# Patient Record
Sex: MALE | Race: WHITE | Employment: FULL TIME
[De-identification: names, ages, dates, MRNs, and addresses within clinical notes are randomized per-mention and may not be internally consistent; named-entity substitution may affect disease eponyms.]

---

## 2017-03-01 ENCOUNTER — OFFICE VISIT (OUTPATIENT)
Facility: CLINIC | Age: 32
End: 2017-03-01

## 2017-03-01 VITALS
TEMPERATURE: 98.2 F | WEIGHT: 189.2 LBS | DIASTOLIC BLOOD PRESSURE: 76 MMHG | OXYGEN SATURATION: 97 % | SYSTOLIC BLOOD PRESSURE: 132 MMHG | BODY MASS INDEX: 27.15 KG/M2 | HEART RATE: 86 BPM

## 2017-03-01 DIAGNOSIS — G89.29 CHRONIC RIGHT SHOULDER PAIN: Primary | ICD-10-CM

## 2017-03-01 DIAGNOSIS — F98.8 ADD (ATTENTION DEFICIT DISORDER): ICD-10-CM

## 2017-03-01 DIAGNOSIS — F41.9 ANXIETY: ICD-10-CM

## 2017-03-01 DIAGNOSIS — E66.3 OVERWEIGHT (BMI 25.0-29.9): ICD-10-CM

## 2017-03-01 DIAGNOSIS — M25.511 CHRONIC RIGHT SHOULDER PAIN: Primary | ICD-10-CM

## 2017-03-01 DIAGNOSIS — G89.29 CHRONIC PAIN OF LEFT ANKLE: ICD-10-CM

## 2017-03-01 DIAGNOSIS — M25.572 CHRONIC PAIN OF LEFT ANKLE: ICD-10-CM

## 2017-03-01 PROCEDURE — 99214 OFFICE O/P EST MOD 30 MIN: CPT | Performed by: FAMILY MEDICINE

## 2017-03-01 RX ORDER — MELOXICAM 15 MG/1
15 TABLET ORAL DAILY
Qty: 30 TABLET | Refills: 3 | Status: SHIPPED | OUTPATIENT
Start: 2017-03-01 | End: 2019-04-17 | Stop reason: SDUPTHER

## 2017-03-08 ENCOUNTER — TELEPHONE (OUTPATIENT)
Facility: CLINIC | Age: 32
End: 2017-03-08

## 2017-03-10 ENCOUNTER — TELEPHONE (OUTPATIENT)
Facility: CLINIC | Age: 32
End: 2017-03-10

## 2017-03-10 DIAGNOSIS — M25.511 CHRONIC RIGHT SHOULDER PAIN: ICD-10-CM

## 2017-03-10 DIAGNOSIS — M25.511 CHRONIC RIGHT SHOULDER PAIN: Primary | ICD-10-CM

## 2017-03-10 DIAGNOSIS — G89.29 CHRONIC RIGHT SHOULDER PAIN: Primary | ICD-10-CM

## 2017-03-10 DIAGNOSIS — G89.29 CHRONIC RIGHT SHOULDER PAIN: ICD-10-CM

## 2018-03-20 ENCOUNTER — HOSPITAL ENCOUNTER (OUTPATIENT)
Dept: PHYSICAL THERAPY | Facility: CLINIC | Age: 33
Setting detail: THERAPIES SERIES
Discharge: HOME | End: 2018-03-20
Payer: COMMERCIAL

## 2018-03-20 PROCEDURE — 97750 PHYSICAL PERFORMANCE TEST: CPT

## 2019-10-23 DIAGNOSIS — G89.29 CHRONIC PAIN OF LEFT ANKLE: ICD-10-CM

## 2019-10-23 DIAGNOSIS — G89.29 CHRONIC RIGHT SHOULDER PAIN: ICD-10-CM

## 2019-10-23 DIAGNOSIS — M25.572 CHRONIC PAIN OF LEFT ANKLE: ICD-10-CM

## 2019-10-23 DIAGNOSIS — M25.511 CHRONIC RIGHT SHOULDER PAIN: ICD-10-CM

## 2019-10-24 RX ORDER — MELOXICAM 15 MG/1
15 TABLET ORAL DAILY
Qty: 30 TABLET | Refills: 3 | Status: SHIPPED | OUTPATIENT
Start: 2019-10-24 | End: 2019-12-11 | Stop reason: ALTCHOICE

## 2019-11-15 ENCOUNTER — HOSPITAL ENCOUNTER (OUTPATIENT)
Dept: MRI IMAGING | Facility: CLINIC | Age: 34
Discharge: HOME | End: 2019-11-17
Payer: COMMERCIAL

## 2019-11-15 DIAGNOSIS — R20.2 PARESTHESIA: ICD-10-CM

## 2019-11-15 DIAGNOSIS — M54.50 ACUTE RIGHT-SIDED LOW BACK PAIN WITHOUT SCIATICA: ICD-10-CM

## 2019-11-15 PROCEDURE — 72148 MRI LUMBAR SPINE W/O DYE: CPT

## 2019-12-11 ENCOUNTER — OFFICE VISIT (OUTPATIENT)
Dept: NEUROSURGERY | Age: 34
End: 2019-12-11
Payer: COMMERCIAL

## 2019-12-11 ENCOUNTER — PATIENT MESSAGE (OUTPATIENT)
Dept: NEUROSURGERY | Age: 34
End: 2019-12-11

## 2019-12-11 VITALS
BODY MASS INDEX: 28.58 KG/M2 | HEART RATE: 85 BPM | HEIGHT: 69 IN | RESPIRATION RATE: 16 BRPM | DIASTOLIC BLOOD PRESSURE: 82 MMHG | SYSTOLIC BLOOD PRESSURE: 138 MMHG | WEIGHT: 193 LBS

## 2019-12-11 DIAGNOSIS — M43.10 PARS DEFECT WITH SPONDYLOLISTHESIS: Primary | ICD-10-CM

## 2019-12-11 DIAGNOSIS — M43.00 PARS DEFECT WITH SPONDYLOLISTHESIS: Primary | ICD-10-CM

## 2019-12-11 PROCEDURE — G8417 CALC BMI ABV UP PARAM F/U: HCPCS | Performed by: NURSE PRACTITIONER

## 2019-12-11 PROCEDURE — G8427 DOCREV CUR MEDS BY ELIG CLIN: HCPCS | Performed by: NURSE PRACTITIONER

## 2019-12-11 PROCEDURE — 1036F TOBACCO NON-USER: CPT | Performed by: NURSE PRACTITIONER

## 2019-12-11 PROCEDURE — 99203 OFFICE O/P NEW LOW 30 MIN: CPT | Performed by: NURSE PRACTITIONER

## 2019-12-11 PROCEDURE — G8484 FLU IMMUNIZE NO ADMIN: HCPCS | Performed by: NURSE PRACTITIONER

## 2019-12-23 ENCOUNTER — HOSPITAL ENCOUNTER (OUTPATIENT)
Dept: PHYSICAL THERAPY | Age: 34
Setting detail: THERAPIES SERIES
Discharge: HOME | End: 2019-12-23
Payer: COMMERCIAL

## 2019-12-23 PROCEDURE — 97110 THERAPEUTIC EXERCISES: CPT

## 2019-12-23 PROCEDURE — 97161 PT EVAL LOW COMPLEX 20 MIN: CPT

## 2020-01-06 NOTE — DISCHARGE SUMMARY
[x] Critical access hospital &  Therapy  [address]  [phone]  F: [phone] [] [address]  [place] [address]   [address]  P: [phone]  F: [phone] [] [place]  [address] [facility]  P: [phone]  F: [phone] [] [address]  [facility]   [address]   [place]: [phone]  F: [phone]         Physical Therapy Discharge Note    Date: 2020      Patient: Nic Starkey  : 1985  MRN: [id number]: Dahlia Stapleton CNP                         Insurance: [employer], [place]   Medical Diagnosis: Pars Defect with spondylolisthesis M43.00, M43.10                 Rehab Codes: M54.5  Onset Date: 10/23/19                             Next 's appt:       Subjective:  Refer to initial evaluation. Objective:  Refer to initial evaluation. Assessment:  Refer to initial evaluation. Treatment to Date:  [x] Therapeutic Exercise    [] Modalities:  [] Therapeutic Activity     [] Ultrasound  [] Electrical Stimulation  [] Gait Training     [] Massage       [] Lumbar/Cervical Traction  [] Neuromuscular Re-education [] Cold/hotpack [] Iontophoresis: 4 mg/mL  [x] Instruction in Home Exercise Program                     Dexamethasone Sodium  [] Manual Therapy             Phosphate 40-80 mAmin  [] Aquatic Therapy                   [] Vasocompression/    [] Other:             Game Ready    Discharge Status:     [] Pt to continue exercise/home instructions independently. [] Therapy interrupted due to:    [] Pt has 2 or more no shows/cancels, is discontinued per our policy. [x] Other: Patient has self discharged from PT, does not wish to continue PT or undergo a future spinal surgery per message.      Electronically signed by Mary Caballero PT on 2020 at 1:46 PM    If you have any questions or concerns, please don't hesitate to call.   Thank you for your referral.

## 2020-01-07 ENCOUNTER — HOSPITAL ENCOUNTER (OUTPATIENT)
Dept: PHYSICAL THERAPY | Age: 35
Setting detail: THERAPIES SERIES
Discharge: HOME | End: 2020-01-07
Payer: COMMERCIAL

## 2020-12-29 DIAGNOSIS — M43.00 PARS DEFECT WITH SPONDYLOLISTHESIS: Primary | ICD-10-CM

## 2020-12-29 DIAGNOSIS — M43.10 PARS DEFECT WITH SPONDYLOLISTHESIS: Primary | ICD-10-CM

## 2021-03-10 ENCOUNTER — HOSPITAL ENCOUNTER (OUTPATIENT)
Dept: GENERAL RADIOLOGY | Age: 36
Discharge: HOME | End: 2021-03-12
Payer: COMMERCIAL

## 2021-03-10 ENCOUNTER — OFFICE VISIT (OUTPATIENT)
Dept: NEUROSURGERY | Age: 36
End: 2021-03-10
Payer: COMMERCIAL

## 2021-03-10 ENCOUNTER — HOSPITAL ENCOUNTER (OUTPATIENT)
Age: 36
Discharge: HOME | End: 2021-03-12
Payer: COMMERCIAL

## 2021-03-10 VITALS
HEART RATE: 79 BPM | BODY MASS INDEX: 28.63 KG/M2 | WEIGHT: 200 LBS | HEIGHT: 70 IN | SYSTOLIC BLOOD PRESSURE: 136 MMHG | TEMPERATURE: 98.7 F | DIASTOLIC BLOOD PRESSURE: 93 MMHG

## 2021-03-10 DIAGNOSIS — M43.00 PARS DEFECT WITH SPONDYLOLISTHESIS: Primary | ICD-10-CM

## 2021-03-10 DIAGNOSIS — M43.00 PARS DEFECT WITH SPONDYLOLISTHESIS: ICD-10-CM
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
## 2021-03-10 DIAGNOSIS — M43.10 PARS DEFECT WITH SPONDYLOLISTHESIS: ICD-10-CM

## 2021-03-10 DIAGNOSIS — M43.10 PARS DEFECT WITH SPONDYLOLISTHESIS: Primary | ICD-10-CM

## 2021-03-10 PROCEDURE — G8417 CALC BMI ABV UP PARAM F/U: HCPCS | Performed by: NURSE PRACTITIONER

## 2021-03-10 PROCEDURE — 72100 X-RAY EXAM L-S SPINE 2/3 VWS: CPT

## 2021-03-10 PROCEDURE — 1036F TOBACCO NON-USER: CPT | Performed by: NURSE PRACTITIONER

## 2021-03-10 PROCEDURE — G8427 DOCREV CUR MEDS BY ELIG CLIN: HCPCS | Performed by: NURSE PRACTITIONER

## 2021-03-10 PROCEDURE — 99213 OFFICE O/P EST LOW 20 MIN: CPT | Performed by: NURSE PRACTITIONER

## 2021-03-10 PROCEDURE — G8484 FLU IMMUNIZE NO ADMIN: HCPCS | Performed by: NURSE PRACTITIONER

## 2021-03-10 RX ORDER — CYCLOBENZAPRINE HCL 10 MG
TABLET ORAL
COMMUNITY
End: 2021-11-17
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
## 2021-03-24 ENCOUNTER — TELEMEDICINE (OUTPATIENT)
Dept: NEUROSURGERY | Age: 36
End: 2021-03-24
Payer: COMMERCIAL

## 2021-03-24 DIAGNOSIS — M43.00 PARS DEFECT WITH SPONDYLOLISTHESIS: Primary | ICD-10-CM

## 2021-03-24 DIAGNOSIS — M43.10 PARS DEFECT WITH SPONDYLOLISTHESIS: Primary | ICD-10-CM

## 2021-03-24 PROCEDURE — G8427 DOCREV CUR MEDS BY ELIG CLIN: HCPCS | Performed by: NURSE PRACTITIONER

## 2021-03-24 PROCEDURE — G8484 FLU IMMUNIZE NO ADMIN: HCPCS | Performed by: NURSE PRACTITIONER

## 2021-03-24 PROCEDURE — G8417 CALC BMI ABV UP PARAM F/U: HCPCS | Performed by: NURSE PRACTITIONER

## 2021-03-24 PROCEDURE — 1036F TOBACCO NON-USER: CPT | Performed by: NURSE PRACTITIONER

## 2021-03-24 PROCEDURE — 99213 OFFICE O/P EST LOW 20 MIN: CPT | Performed by: NURSE PRACTITIONER
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
## 2021-08-02 ENCOUNTER — HOSPITAL ENCOUNTER (OUTPATIENT)
Dept: PHYSICAL THERAPY | Age: 36
Setting detail: THERAPIES SERIES
Discharge: HOME | End: 2021-08-02
Payer: OTHER MISCELLANEOUS

## 2021-08-02 PROCEDURE — 97140 MANUAL THERAPY 1/> REGIONS: CPT

## 2021-08-02 PROCEDURE — 97110 THERAPEUTIC EXERCISES: CPT

## 2021-08-02 PROCEDURE — 97162 PT EVAL MOD COMPLEX 30 MIN: CPT
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
## 2021-08-11 ENCOUNTER — HOSPITAL ENCOUNTER (OUTPATIENT)
Dept: PHYSICAL THERAPY | Age: 36
Setting detail: THERAPIES SERIES
Discharge: HOME | End: 2021-08-11
Payer: OTHER MISCELLANEOUS

## 2021-08-11 PROCEDURE — 97110 THERAPEUTIC EXERCISES: CPT

## 2021-08-11 PROCEDURE — 97140 MANUAL THERAPY 1/> REGIONS: CPT

## 2021-08-11 PROCEDURE — G0283 ELEC STIM OTHER THAN WOUND: HCPCS

## 2021-08-16 ENCOUNTER — HOSPITAL ENCOUNTER (OUTPATIENT)
Dept: PHYSICAL THERAPY | Age: 36
Setting detail: THERAPIES SERIES
Discharge: HOME | End: 2021-08-16
Payer: OTHER MISCELLANEOUS

## 2021-08-16 PROCEDURE — G0283 ELEC STIM OTHER THAN WOUND: HCPCS

## 2021-08-16 PROCEDURE — 97140 MANUAL THERAPY 1/> REGIONS: CPT

## 2021-08-16 PROCEDURE — 97110 THERAPEUTIC EXERCISES: CPT

## 2021-08-19 ENCOUNTER — HOSPITAL ENCOUNTER (OUTPATIENT)
Dept: PHYSICAL THERAPY | Age: 36
Setting detail: THERAPIES SERIES
Discharge: HOME | End: 2021-08-19
Payer: OTHER MISCELLANEOUS

## 2021-08-19 PROCEDURE — 97140 MANUAL THERAPY 1/> REGIONS: CPT

## 2021-08-19 PROCEDURE — G0283 ELEC STIM OTHER THAN WOUND: HCPCS

## 2021-09-20 ENCOUNTER — NURSE TRIAGE (OUTPATIENT)
Dept: OTHER | Facility: CLINIC | Age: 36
End: 2021-09-20
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
## 2021-10-20 ENCOUNTER — TELEPHONE (OUTPATIENT)
Dept: PSYCHOLOGY | Age: 36
End: 2021-10-20

## 2021-10-20 PROBLEM — K21.9 GASTROESOPHAGEAL REFLUX DISEASE WITHOUT ESOPHAGITIS: Status: ACTIVE | Noted: 2021-10-20

## 2021-10-20 PROBLEM — F41.9 ANXIETY: Status: ACTIVE | Noted: 2021-10-20

## 2021-10-20 PROBLEM — Z00.00 ANNUAL PHYSICAL EXAM: Status: ACTIVE | Noted: 2021-10-20

## 2021-10-23 ENCOUNTER — HOSPITAL ENCOUNTER (OUTPATIENT)
Age: 36
Discharge: HOME | End: 2021-10-23
Payer: COMMERCIAL

## 2021-10-23 DIAGNOSIS — Z00.00 ANNUAL PHYSICAL EXAM: ICD-10-CM

## 2021-10-23 LAB
ABSOLUTE EOS #: 0.3 K/UL (ref 0–0.4)
ABSOLUTE IMMATURE GRANULOCYTE: ABNORMAL K/UL (ref 0–0.3)
ABSOLUTE LYMPH #: 1.9 K/UL (ref 1–4.8)
ABSOLUTE MONO #: 0.6 K/UL (ref 0.1–1.3)
ALBUMIN SERPL-MCNC: 4.4 G/DL (ref 3.5–5.2)
ALBUMIN/GLOBULIN RATIO: ABNORMAL (ref 1–2.5)
ALP BLD-CCNC: 82 U/L (ref 40–129)
ALT SERPL-CCNC: 32 U/L (ref 5–41)
ANION GAP SERPL CALCULATED.3IONS-SCNC: 9 MMOL/L (ref 9–17)
AST SERPL-CCNC: 16 U/L
BASOPHILS # BLD: 1 % (ref 0–2)
BASOPHILS ABSOLUTE: 0 K/UL (ref 0–0.2)
BILIRUB SERPL-MCNC: 0.67 MG/DL (ref 0.3–1.2)
BUN BLDV-MCNC: 13 MG/DL (ref 6–20)
BUN/CREAT BLD: ABNORMAL (ref 9–20)
CALCIUM SERPL-MCNC: 9.1 MG/DL (ref 8.6–10.4)
CHLORIDE BLD-SCNC: 106 MMOL/L (ref 98–107)
CHOLESTEROL, FASTING: 160 MG/DL
CHOLESTEROL/HDL RATIO: 5
CO2: 26 MMOL/L (ref 20–31)
CREAT SERPL-MCNC: 0.8 MG/DL (ref 0.7–1.2)
DIFFERENTIAL TYPE: ABNORMAL
EOSINOPHILS RELATIVE PERCENT: 4 % (ref 0–4)
GFR AFRICAN AMERICAN: >60 ML/MIN
GFR NON-AFRICAN AMERICAN: >60 ML/MIN
GFR SERPL CREATININE-BSD FRML MDRD: ABNORMAL ML/MIN/{1.73_M2}
GFR SERPL CREATININE-BSD FRML MDRD: ABNORMAL ML/MIN/{1.73_M2}
GLUCOSE BLD-MCNC: 106 MG/DL (ref 70–99)
HCT VFR BLD CALC: 43.1 % (ref 41–53)
HDLC SERPL-MCNC: 32 MG/DL
HEMOGLOBIN: 14.9 G/DL (ref 13.5–17.5)
IMMATURE GRANULOCYTES: ABNORMAL %
LDL CHOLESTEROL: 111 MG/DL (ref 0–130)
LYMPHOCYTES # BLD: 29 % (ref 24–44)
MCH RBC QN AUTO: 30.4 PG (ref 26–34)
MCHC RBC AUTO-ENTMCNC: 34.6 G/DL (ref 31–37)
MCV RBC AUTO: 87.7 FL (ref 80–100)
MONOCYTES # BLD: 9 % (ref 1–7)
NRBC AUTOMATED: ABNORMAL PER 100 WBC
PDW BLD-RTO: 12.7 % (ref 11.5–14.9)
PLATELET # BLD: 245 K/UL (ref 150–450)
PLATELET ESTIMATE: ABNORMAL
PMV BLD AUTO: 9.1 FL (ref 6–12)
POTASSIUM SERPL-SCNC: 4 MMOL/L (ref 3.7–5.3)
RBC # BLD: 4.91 M/UL (ref 4.5–5.9)
RBC # BLD: ABNORMAL 10*6/UL
SEG NEUTROPHILS: 57 % (ref 36–66)
SEGMENTED NEUTROPHILS ABSOLUTE COUNT: 3.7 K/UL (ref 1.3–9.1)
SODIUM BLD-SCNC: 141 MMOL/L (ref 135–144)
T3 FREE: 3.65 PG/ML (ref 2.02–4.43)
TOTAL PROTEIN: 7.2 G/DL (ref 6.4–8.3)
TRIGLYCERIDE, FASTING: 86 MG/DL
TSH SERPL DL<=0.05 MIU/L-ACNC: 0.62 MIU/L (ref 0.3–5)
VLDLC SERPL CALC-MCNC: ABNORMAL MG/DL (ref 1–30)
WBC # BLD: 6.5 K/UL (ref 3.5–11)
WBC # BLD: ABNORMAL 10*3/UL

## 2021-10-23 PROCEDURE — 85025 COMPLETE CBC W/AUTO DIFF WBC: CPT

## 2021-10-23 PROCEDURE — 84436 ASSAY OF TOTAL THYROXINE: CPT

## 2021-10-23 PROCEDURE — 36415 COLL VENOUS BLD VENIPUNCTURE: CPT

## 2021-10-23 PROCEDURE — 84443 ASSAY THYROID STIM HORMONE: CPT

## 2021-10-23 PROCEDURE — 80061 LIPID PANEL: CPT

## 2021-10-23 PROCEDURE — 80053 COMPREHEN METABOLIC PANEL: CPT

## 2021-10-23 PROCEDURE — 84481 FREE ASSAY (FT-3): CPT

## 2021-10-24 LAB — T4 TOTAL: 5.8 UG/DL (ref 4.5–10.9)

## 2021-11-12 ENCOUNTER — OFFICE VISIT (OUTPATIENT)
Dept: PSYCHOLOGY | Age: 36
End: 2021-11-12
Payer: COMMERCIAL

## 2021-11-12 DIAGNOSIS — F43.22 ADJUSTMENT DISORDER WITH ANXIOUS MOOD: Primary | ICD-10-CM

## 2021-11-12 PROCEDURE — 90791 PSYCH DIAGNOSTIC EVALUATION: CPT | Performed by: SOCIAL WORKER

## 2021-11-12 PROCEDURE — 1036F TOBACCO NON-USER: CPT | Performed by: SOCIAL WORKER
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
## 2021-11-19 PROBLEM — Z00.00 ANNUAL PHYSICAL EXAM: Status: RESOLVED | Noted: 2021-10-20 | Resolved: 2021-11-19

## 2021-11-23 ENCOUNTER — HOSPITAL ENCOUNTER (OUTPATIENT)
Dept: SLEEP CENTER | Age: 36
Discharge: HOME | End: 2021-11-25
Payer: COMMERCIAL

## 2021-11-23 DIAGNOSIS — R06.83 LOUD SNORING: ICD-10-CM

## 2021-11-23 DIAGNOSIS — G47.00 FREQUENT NOCTURNAL AWAKENING: ICD-10-CM

## 2021-11-23 PROCEDURE — 95810 POLYSOM 6/> YRS 4/> PARAM: CPT

## 2021-11-24 VITALS
HEIGHT: 69 IN | WEIGHT: 195 LBS | RESPIRATION RATE: 14 BRPM | OXYGEN SATURATION: 95 % | BODY MASS INDEX: 28.88 KG/M2 | HEART RATE: 55 BPM

## 2021-12-06 LAB — STATUS: NORMAL

## 2021-12-06 PROCEDURE — 95810 POLYSOM 6/> YRS 4/> PARAM: CPT | Performed by: PSYCHIATRY & NEUROLOGY

## 2021-12-08 ENCOUNTER — OFFICE VISIT (OUTPATIENT)
Dept: PSYCHOLOGY | Age: 36
End: 2021-12-08
Payer: COMMERCIAL

## 2021-12-08 DIAGNOSIS — F43.22 ADJUSTMENT DISORDER WITH ANXIOUS MOOD: Primary | ICD-10-CM

## 2021-12-08 PROCEDURE — 1036F TOBACCO NON-USER: CPT | Performed by: SOCIAL WORKER

## 2021-12-08 PROCEDURE — 90832 PSYTX W PT 30 MINUTES: CPT | Performed by: SOCIAL WORKER

## 2021-12-09 NOTE — PROGRESS NOTES
ADULT BEHAVIORAL HEALTH FOLLOW UP  SARAH Jones  Licensed Independent          Visit Date: 12/8/2021   Time of appointment: 4:00 PM    Time spent with Patient: 35 minutes. This is patient's second appointment. Reason for Consult:  Anxiety     PCP:  Debbie Lugo MD      Pt provided informed consent for the behavioral health program. Discussed with patient model of service to include the limits of confidentiality (i.e. abuse reporting, suicide intervention, etc.) and short-term intervention focused approach. Pt indicated understanding. Liana Lilly is a 39 y.o. male who presents for follow up of anxiety. Jeremy Reece reported he has experienced improvement in anxious symptoms since last visit. He shared that his wife has also noticed improvement in his behavior. He believes this may be related to taking his medication. He admitted that there are still various behaviors and times that he becomes frustrated with the children and sometimes reacts. Jeremy Reece was able to recognize in session that him and his wife not being on the same page often contributes to conflict instead of assisting with parenting strategies. He is hesitant to change some of his behavior due to how he was raised and beliefs, but is open minded to learn new ways to work through family conflict. At this time, Néstor is being referred to family counseling as many of his symptoms are related to family concerns and conflict resolution within relationship. Previous Recommendations: Jeremy Reece was scheduled for follow-up appointment to discuss treatment options on 11/24 after he speaks with his wife about family services. MENTAL STATUS EXAM  Mood was within normal limits with calm affect. Suicidal ideation was denied. Homicidal ideation was denied. Hygiene was good . Dress was appropriate. Behavior was Within Normal Limits with No observation or self-report of difficulties ambulating.    Attitude was Engageable. Eye-contact was good. Speech: rate - WNL, rhythm - WNL, volume - WNL. Verbalizations were coherent. Thought processes were intact and goal-oriented without evidence of delusions, hallucinations, obsessions, or clair; with moderate cognitive distortions. Associations were characterized by intact cognitive processes. Pt was oriented to person, place, time, and general circumstances;  recent:  good. Insight and judgment were estimated to be good, AEB, a fair  understanding of cyclical maladaptive patterns, and the ability to use insight to inform behavior change. ASSESSMENT  Sari Cabot presented to the appointment today for evaluation and treatment of symptoms of anxiety. He is currently deemed no risk to himself or others and meets criteria for Adjustment disorder with anxious mood. Nury Marroquin was in agreement with recommendations of receiving referral to family counselor to begin addressing issues within family system to improve his relationship with wife and children. Referral information sent via My Chart. PHQ Scores 11/2/2021 2/11/2021 11/11/2019 7/26/2017   PHQ2 Score 2 0 0 0   PHQ9 Score 2 0 0 0     Interpretation of Total Score Depression Severity: 1-4 = Minimal depression, 5-9 = Mild depression, 10-14 = Moderate depression, 15-19 = Moderately severe depression, 20-27 = Severe depression    How often pt has had thoughts of death or hurting self (if PHQ positive for depression):       JAYA 7 SCORE 10/28/2021   JAYA-7 Total Score 13     Interpretation of JAYA-7 score: 5-9 = mild anxiety, 10-14 = moderate anxiety, 15+ = severe anxiety. Recommend referral to behavioral health for scores 10 or greater. DIAGNOSIS  Nury Marroquin was seen today for anxiety.     Diagnoses and all orders for this visit:    Adjustment disorder with anxious mood        INTERVENTION  CBT to target cycle of anxious thoughts, communication barriers/breakdown within family system, Collaborative treatment planning,Clarified role of [facility] in primary care,Recommended that pt establish with a mental health clinician with whom they can meet regularly for psychotherapy services and Reviewed options for identifying appropriate providers. Rosanne Laura was sent family counseling referrals via My Chart message for further follow-up. INTERACTIVE COMPLEXITY  Is interactive complexity present?   No  Reason:  N/A  Additional Supporting Information:  N/A       Electronically signed by Graciela Begum on 12/22/2021 at 4:13 PM

## 2022-03-25 ENCOUNTER — TELEPHONE (OUTPATIENT)
Dept: GASTROENTEROLOGY | Age: 37
End: 2022-03-25

## 2022-03-25 NOTE — TELEPHONE ENCOUNTER
Pt called to [facility] appt was referred by pcp pt does not have a referral. Writer advised pt to call pcp and have them send referral and call back to [facility].

## 2022-04-01 ENCOUNTER — OFFICE VISIT (OUTPATIENT)
Dept: FAMILY MEDICINE CLINIC | Age: 37
End: 2022-04-01
Payer: COMMERCIAL

## 2022-04-01 VITALS
WEIGHT: 215 LBS | RESPIRATION RATE: 20 BRPM | SYSTOLIC BLOOD PRESSURE: 118 MMHG | HEART RATE: 70 BPM | DIASTOLIC BLOOD PRESSURE: 78 MMHG | BODY MASS INDEX: 31.84 KG/M2 | OXYGEN SATURATION: 98 % | HEIGHT: 69 IN

## 2022-04-01 DIAGNOSIS — R73.01 ELEVATED FASTING GLUCOSE: ICD-10-CM

## 2022-04-01 DIAGNOSIS — F41.9 ANXIETY: ICD-10-CM

## 2022-04-01 DIAGNOSIS — J30.2 SEASONAL ALLERGIES: ICD-10-CM

## 2022-04-01 DIAGNOSIS — K21.9 GASTROESOPHAGEAL REFLUX DISEASE WITHOUT ESOPHAGITIS: ICD-10-CM

## 2022-04-01 DIAGNOSIS — Z76.89 ENCOUNTER TO ESTABLISH CARE: Primary | ICD-10-CM

## 2022-04-01 DIAGNOSIS — K58.0 IRRITABLE BOWEL SYNDROME WITH DIARRHEA: ICD-10-CM

## 2022-04-01 LAB — HBA1C MFR BLD: 5.5 %

## 2022-04-01 PROCEDURE — 99214 OFFICE O/P EST MOD 30 MIN: CPT | Performed by: NURSE PRACTITIONER

## 2022-04-01 PROCEDURE — G8427 DOCREV CUR MEDS BY ELIG CLIN: HCPCS | Performed by: NURSE PRACTITIONER

## 2022-04-01 PROCEDURE — 1036F TOBACCO NON-USER: CPT | Performed by: NURSE PRACTITIONER

## 2022-04-01 PROCEDURE — 83036 HEMOGLOBIN GLYCOSYLATED A1C: CPT | Performed by: NURSE PRACTITIONER

## 2022-04-01 PROCEDURE — G8417 CALC BMI ABV UP PARAM F/U: HCPCS | Performed by: NURSE PRACTITIONER

## 2022-04-01 RX ORDER — OMEPRAZOLE 20 MG/1
20 CAPSULE, DELAYED RELEASE ORAL DAILY
Qty: 30 CAPSULE | Refills: 3 | Status: SHIPPED | OUTPATIENT
Start: 2022-04-01 | End: 2022-08-24

## 2022-04-01 RX ORDER — DICYCLOMINE HYDROCHLORIDE 10 MG/1
10 CAPSULE ORAL 4 TIMES DAILY
Qty: 120 CAPSULE | Refills: 3 | Status: SHIPPED | OUTPATIENT
Start: 2022-04-01

## 2022-04-01 RX ORDER — FEXOFENADINE HCL AND PSEUDOEPHEDRINE HCI 180; 240 MG/1; MG/1
1 TABLET, EXTENDED RELEASE ORAL DAILY
Qty: 30 TABLET | Refills: 2 | Status: SHIPPED | OUTPATIENT
Start: 2022-04-01 | End: 2022-05-18 | Stop reason: SDUPTHER

## 2022-04-01 RX ORDER — HYDROXYZINE HYDROCHLORIDE 25 MG/1
25 TABLET, FILM COATED ORAL EVERY 8 HOURS PRN
Qty: 30 TABLET | Refills: 0
Start: 2022-04-01 | End: 2022-04-11

## 2022-04-01 SDOH — HEALTH STABILITY: PHYSICAL HEALTH: ON AVERAGE, HOW MANY MINUTES DO YOU ENGAGE IN EXERCISE AT THIS LEVEL?: 60 MIN

## 2022-04-01 SDOH — HEALTH STABILITY: PHYSICAL HEALTH: ON AVERAGE, HOW MANY DAYS PER WEEK DO YOU ENGAGE IN MODERATE TO STRENUOUS EXERCISE (LIKE A BRISK WALK)?: 5 DAYS

## 2022-04-01 SDOH — ECONOMIC STABILITY: FOOD INSECURITY: WITHIN THE PAST 12 MONTHS, YOU WORRIED THAT YOUR FOOD WOULD RUN OUT BEFORE YOU GOT MONEY TO BUY MORE.: NEVER TRUE

## 2022-04-01 SDOH — ECONOMIC STABILITY: FOOD INSECURITY: WITHIN THE PAST 12 MONTHS, THE FOOD YOU BOUGHT JUST DIDN'T LAST AND YOU DIDN'T HAVE MONEY TO GET MORE.: NEVER TRUE

## 2022-04-01 ASSESSMENT — SOCIAL DETERMINANTS OF HEALTH (SDOH)
WITHIN THE LAST YEAR, HAVE YOU BEEN KICKED, HIT, SLAPPED, OR OTHERWISE PHYSICALLY HURT BY YOUR PARTNER OR EX-PARTNER?: NO
WITHIN THE LAST YEAR, HAVE YOU BEEN AFRAID OF YOUR PARTNER OR EX-PARTNER?: NO
WITHIN THE LAST YEAR, HAVE YOU BEEN HUMILIATED OR EMOTIONALLY ABUSED IN OTHER WAYS BY YOUR PARTNER OR EX-PARTNER?: NO
WITHIN THE LAST YEAR, HAVE TO BEEN RAPED OR FORCED TO HAVE ANY KIND OF SEXUAL ACTIVITY BY YOUR PARTNER OR EX-PARTNER?: NO
HOW HARD IS IT FOR YOU TO PAY FOR THE VERY BASICS LIKE FOOD, HOUSING, MEDICAL CARE, AND HEATING?: NOT HARD AT ALL

## 2022-04-01 NOTE — PATIENT INSTRUCTIONS
Patient Education        dicyclomine (oral/injection)  Pronunciation: steve hobbsdavid arevalo  Brand: Bentyl, Dibent, Dicyclocot  What is the most important information I should know about dicyclomine? Many drugs can affect dicyclomine. Tell your doctor about all your current medicines. What is dicyclomine? Dicyclomine is used to treat functional bowel or irritable bowel syndrome. Dicyclomine may also be used for purposes not listed in this medication guide. What should I discuss with my healthcare provider before taking dicyclomine? You should not use dicyclomine if you are allergic to it, or if you have:   glaucoma;   a bladder obstruction or other urination problems;   a blockage in your digestive tract (stomach or intestines);   severe ulcerative colitis;   gastroesophageal reflux disease (GERD);   a serious heart condition and active bleeding;   myasthenia gravis; or   if you are breastfeeding a baby. Not approved for use by anyone younger than 25years old. Dicyclomine should never be given to a child younger than 7 months old. Tell your doctor if you have ever had:   heart problems or high blood pressure;   a stroke;   ulcerative colitis;   an ileostomy or colostomy;   an enlarged prostate; or   liver or kidney disease. Older adults may be more sensitive to the effects of this medicine. Tell your doctor if you are pregnant. Do not breastfeed. How should I take dicyclomine? Follow all directions on your prescription label and read all medication guides or instruction sheets. Your doctor may occasionally change your dose. Use themedicine exactly as directed. Dicyclomine oral is taken by mouth. Measure liquid medicine with the supplied syringe or a dose-measuring device (not a kitchen spoon). Dicyclomine injection is given in a muscle if you are unable to take the medicine by mouth. Call your doctor if your symptoms do not improve after 2 weeks.   Store at room temperature away from moisture and heat. What happens if I miss a dose? Skip the missed dose and use your next dose at the regular time. Do not use two doses at one time. What happens if I overdose? Seek emergency medical attention or call the Poison Help line at 1-882.518.2252. Overdose can cause nausea, vomiting, dilated pupils, weakness or loss of movement in any part of your body, trouble swallowing, fainting, or seizure(convulsions). What should I avoid while taking dicyclomine? May cause dizziness or blurred vision. Avoid driving or hazardous activityuntil you know how this medicine will affect you. Avoid becoming overheated or dehydrated during exercise and in hot weather. Dicyclomine can decrease sweating and you may be more prone to heat stroke. Tell your doctor if you have a fever while taking dicyclomine. Avoid using an antacid. Antacids can make it harder for your body to absorb dicyclomine oral.  What are the possible side effects of dicyclomine? Get emergency medical help if you have signs of an allergic reaction: hives; difficult breathing; swelling of your face, lips, tongue, or throat. Call your doctor at once if you have:   fast or slow heartbeats, pounding heartbeats or fluttering in your chest;   confusion, agitation, hallucinations, unusual thoughts or behavior;   problems with memory or speech;   problems with balance or muscle movement;   diarrhea, severe constipation, or worsening of bowel symptoms;   trouble swallowing;   bruising, swelling, or pain where a dicyclomine injection was given; or   dehydration  --dizziness, confusion, feeling very thirsty, less urination or sweating. Confusion and mood or behavior changes may be more likely in older adults. Common side effects may include:   drowsiness, dizziness, weakness, nervousness;   blurred vision;   dry mouth; or   nausea. This is not a complete list of side effects and others may occur.  Call your doctor for medical advice about side effects. You may report side effects toFDA at 8-468-QTO-7125. What other drugs will affect dicyclomine? Using dicyclomine with other drugs that make you drowsy can worsen this effect. Ask your doctor before using opioid medication, a sleeping pill, a musclerelaxer, or medicine for anxiety or seizures. Tell your doctor about all your current medicines. Many drugs can affect dicyclomine, especially:   bronchodilator asthma medication;   cold or allergy medicine (Benadryl and others);   glaucoma medication;   heart medication;   medicine to treat depression, anxiety, mood disorders, or mental illness;   medicine to treat overactive bladder;   medicine to treat Parkinson's disease; or   medicine to treat stomach problems, motion sickness, or irritable bowel syndrome. This list is not complete and many other drugs may affect dicyclomine. This includes prescription and over-the-counter medicines, vitamins, andherbal products. Not all possible drug interactions are listed here. Where can I get more information? Your pharmacist can provide more information about dicyclomine. Remember, keep this and all other medicines out of the reach of children, never share your medicines with others, and use this medication only for the indication prescribed. Every effort has been made to ensure that the information provided by [address] is accurate, up-to-date, and complete, but no guarantee is made to that effect. Drug information contained herein may be time sensitive. [facility] information has been compiled for use by healthcare practitioners and consumers in the [place] and therefore [facility] does not warrant that uses outside of the [place] are appropriate, unless specifically indicated otherwise. [facility]'s drug information does not endorse drugs, diagnose patients or recommend therapy.  [employer]'s drug information is an informational resource designed to assist licensed healthcare practitioners in caring for their patients and/or to serve consumers viewing this service as a supplement to, and not a substitute for, the expertise, skill, knowledge and judgment of healthcare practitioners. The absence of a warning for a given drug or drug combination in no way should be construed to indicate that the drug or drug combination is safe, effective or appropriate for any given patient. [facility] does not assume any responsibility for any aspect of healthcare administered with the aid of information [facility] provides. The information contained herein is not intended to cover all possible uses, directions, precautions, warnings, drug interactions, allergic reactions, or adverse effects. If you have questions about the drugs you are taking, check with yourdoctor, nurse or pharmacist.  Copyright 0251-8031 [address]. Version: 6.01. Revision date: 7/8/2021. Care instructions adapted under license by [facility] If you have questions about a medical condition or this instruction, always ask your healthcare professional. [place] [address] any warranty or liability for your use of this information.

## 2022-04-01 NOTE — PROGRESS NOTES
Levi Coley (:  1985) is a 39 y.o. male,New patient, here for evaluation of the following chief complaint(s):  Established New Doctor         ASSESSMENT/PLAN:  1. Encounter to establish care  Comments:  Reviewed blood work from 10/2021  2. Gastroesophageal reflux disease without esophagitis  Comments:  Uncontrolled - take prilosec daily. Avoid spicy,greasy,fried foods-remain upright after eating for at least 2 hours before laying down. Orders:  -     Carlo Essex, MD, Gastroenterology, [place]  -     omeprazole ([address]) 20 MG delayed release capsule; Take 1 capsule by mouth Daily, Disp-30 capsule, R-3Normal  3. Seasonal allergies  lifestyle modifications discussed - allergen avoidance, washing pillowcases weekly, no street clothes in bed, washing hands and face when coming in from outside, no pets in bed, no excessive pillows in bed. -     fexofenadine-pseudoephedrine (ALLEGRA-D 24HR) 180-240 MG per extended release tablet; Take 1 tablet by mouth daily, Disp-30 tablet, R-2Normal  4. Anxiety  Assessment & Plan:   Well-controlled, continue current medications  Orders:  -     hydrOXYzine (ATARAX) 25 MG tablet; Take 1 tablet by mouth every 8 hours as needed for Itching, Disp-30 tablet, R-0Adjust Sig  5. Irritable bowel syndrome with diarrhea  Comments:  Take bentyl with diarrhea episodes. Begin keeping track of symptoms for review with GI. Orders:  -     Carlo Essex, MD, Gastroenterology, [place]  -     dicyclomine (BENTYL) 10 MG capsule; Take 1 capsule by mouth 4 times daily, Disp-120 capsule, R-3Normal  6.  Elevated fasting glucose  -     POCT glycosylated hemoglobin (Hb A1C)      Return in about 7 months (around 10/24/2022) for physical.         Subjective   SUBJECTIVE/OBJECTIVE:  Presents as new patient to establish care  Previous patient of Dr. Raul Fabian  , has 4 kids     GERD: Seems to be getting worse  Had one mcchicken at work and chest started burning almost immediately  Currently taking prilosec as needed - this does help slightly with symptoms     GI: hx of colonoscopy at 17 d/t abdominal pain - was determined to be related to stress  Strong family hx of chrons   Today he has had 4 episodes of diarrhea - endorses what seems like cyclical diarrhea and normal BMs   Would like to finally meet with GI to discuss     Eyes: Up to date  Dentist: Up to date    Anxiety: Got worse after covid-19   Uses atarax 25mg prn - seems to work ok and calms his heart rate down a few minutes after taking     Allergies: Has met with allergy specialist and completed allergy test  Was told he was allergic to pretty much everything on his skin test   Currently using allegra - not quite doing it for him, feels congested all of the time     Updated blood work in Oct of 2021 - there was some concern about fasting glucose level. Denies any other problems/concerns at this time              Review of Systems   Constitutional: Negative for activity change, chills, fatigue and unexpected weight change. HENT: Positive for congestion, sinus pressure and sinus pain. Negative for hearing loss and postnasal drip. Eyes: Negative for pain and visual disturbance. Respiratory: Negative for chest tightness and shortness of breath. Cardiovascular: Negative for chest pain and palpitations. Gastrointestinal: Positive for diarrhea. Negative for abdominal pain, constipation, nausea and vomiting. GERD   Endocrine: Negative for polydipsia, polyphagia and polyuria. Genitourinary: Negative for dysuria, flank pain, frequency and urgency. Musculoskeletal: Negative for arthralgias, back pain and myalgias. Skin: Negative for color change and rash. Allergic/Immunologic: Positive for environmental allergies. Neurological: Negative for dizziness, weakness, light-headedness, numbness and headaches.    Psychiatric/Behavioral: Negative for confusion, hallucinations, self-injury, sleep disturbance and suicidal ideas. The patient is nervous/anxious. Objective   Physical Exam  Vitals and nursing note reviewed. Constitutional:       Appearance: Normal appearance. HENT:      Head: Normocephalic. Right Ear: Tympanic membrane, ear canal and external ear normal.      Left Ear: Tympanic membrane, ear canal and external ear normal.      Nose: Nose normal.      Mouth/Throat:      Mouth: Mucous membranes are moist.      Pharynx: Oropharynx is clear. Eyes:      Extraocular Movements: Extraocular movements intact. Conjunctiva/sclera: Conjunctivae normal.      Pupils: Pupils are equal, round, and reactive to light. Cardiovascular:      Rate and Rhythm: Normal rate and regular rhythm. Pulses: Normal pulses. Heart sounds: Normal heart sounds, S1 normal and S2 normal.   Pulmonary:      Effort: Pulmonary effort is normal.      Breath sounds: Normal breath sounds and air entry. Abdominal:      General: Abdomen is flat. Bowel sounds are normal.      Palpations: Abdomen is soft. Musculoskeletal:         General: Normal range of motion. Cervical back: Normal range of motion. Skin:     General: Skin is warm and dry. Capillary Refill: Capillary refill takes less than 2 seconds. Neurological:      General: No focal deficit present. Mental Status: He is alert and oriented to person, place, and time. Mental status is at baseline. Psychiatric:         Attention and Perception: Attention and perception normal.         Mood and Affect: Mood is anxious. Speech: Speech normal.         Behavior: Behavior normal. Behavior is cooperative. Thought Content:  Thought content normal.         Cognition and Memory: Cognition and memory normal.         Judgment: Judgment normal.                Wt Readings from Last 3 Encounters:   04/01/22 215 lb (97.5 kg)   11/24/21 195 lb (88.5 kg)   10/28/21 195 lb (88.5 kg)     Temp Readings from Last 3 Encounters:   10/18/21 98 °F (36.7 °C) (Tympanic)   08/25/21 98.1 °F (36.7 °C)   03/10/21 98.7 °F (37.1 °C) (Temporal)     BP Readings from Last 3 Encounters:   04/01/22 118/78   10/28/21 124/82   10/18/21 138/80     Pulse Readings from Last 3 Encounters:   04/01/22 70   11/24/21 55   10/28/21 70         An electronic signature was used to authenticate this note.     --Garth Villarreal, LILLIE - NP

## 2022-04-03 ASSESSMENT — ENCOUNTER SYMPTOMS
EYE PAIN: 0
DIARRHEA: 1
COLOR CHANGE: 0
SINUS PAIN: 1
ABDOMINAL PAIN: 0
SINUS PRESSURE: 1
NAUSEA: 0
CHEST TIGHTNESS: 0
BACK PAIN: 0
CONSTIPATION: 0
VOMITING: 0
SHORTNESS OF BREATH: 0

## 2022-04-13 ENCOUNTER — TELEPHONE (OUTPATIENT)
Dept: GASTROENTEROLOGY | Age: 37
End: 2022-04-13

## 2022-04-13 ENCOUNTER — HOSPITAL ENCOUNTER (OUTPATIENT)
Age: 37
Setting detail: SPECIMEN
Discharge: HOME | End: 2022-04-13

## 2022-04-13 ENCOUNTER — OFFICE VISIT (OUTPATIENT)
Dept: GASTROENTEROLOGY | Age: 37
End: 2022-04-13
Payer: COMMERCIAL

## 2022-04-13 VITALS
OXYGEN SATURATION: 97 % | HEART RATE: 70 BPM | BODY MASS INDEX: 30.21 KG/M2 | SYSTOLIC BLOOD PRESSURE: 132 MMHG | WEIGHT: 211 LBS | DIASTOLIC BLOOD PRESSURE: 82 MMHG | HEIGHT: 70 IN

## 2022-04-13 DIAGNOSIS — J30.2 SEASONAL ALLERGIES: Primary | ICD-10-CM

## 2022-04-13 DIAGNOSIS — R19.7 DIARRHEA, UNSPECIFIED TYPE: Primary | ICD-10-CM

## 2022-04-13 DIAGNOSIS — R19.7 DIARRHEA, UNSPECIFIED TYPE: ICD-10-CM

## 2022-04-13 PROCEDURE — G8417 CALC BMI ABV UP PARAM F/U: HCPCS | Performed by: INTERNAL MEDICINE

## 2022-04-13 PROCEDURE — 99203 OFFICE O/P NEW LOW 30 MIN: CPT | Performed by: INTERNAL MEDICINE

## 2022-04-13 PROCEDURE — G8427 DOCREV CUR MEDS BY ELIG CLIN: HCPCS | Performed by: INTERNAL MEDICINE

## 2022-04-13 PROCEDURE — 1036F TOBACCO NON-USER: CPT | Performed by: INTERNAL MEDICINE

## 2022-04-13 RX ORDER — LORATADINE AND PSEUDOEPHEDRINE SULFATE 10; 240 MG/1; MG/1
1 TABLET, EXTENDED RELEASE ORAL DAILY
Qty: 30 TABLET | Refills: 1 | Status: SHIPPED | OUTPATIENT
Start: 2022-04-13 | End: 2022-05-25 | Stop reason: SDUPTHER

## 2022-04-13 RX ORDER — DIPHENOXYLATE HYDROCHLORIDE AND ATROPINE SULFATE 2.5; .025 MG/1; MG/1
1 TABLET ORAL 4 TIMES DAILY PRN
Qty: 15 TABLET | Refills: 0 | Status: SHIPPED | OUTPATIENT
Start: 2022-04-13 | End: 2022-04-23

## 2022-04-13 RX ORDER — BISACODYL 5 MG
TABLET, DELAYED RELEASE (ENTERIC COATED) ORAL
Qty: 4 TABLET | Refills: 0 | Status: SHIPPED | OUTPATIENT
Start: 2022-04-13 | End: 2022-05-06

## 2022-04-13 RX ORDER — POLYETHYLENE GLYCOL 3350 17 G/17G
POWDER, FOR SOLUTION ORAL
Qty: 289 G | Refills: 0 | Status: SHIPPED | OUTPATIENT
Start: 2022-04-13 | End: 2022-05-06

## 2022-04-13 NOTE — TELEPHONE ENCOUNTER
Procedure Scheduled/Linh Jaimes  EGD - dyspepsia  Colonoscopy diagnostic - diarrhea  4/26/22  10:00 am  [facility]    Miralax/Dulcolax instructions given in office

## 2022-04-13 NOTE — PROGRESS NOTES
Reason for Referral: Chronic diarrhea, fecal urgency        LILLIE Magaña NP  [address] [facility] [address]    Chief Complaint   Patient presents with    Establish Care    Diarrhea     multiple times daily/ only a minute notice prior to restroom/ aumodium AD no longer helping. HISTORY OF PRESENT ILLNESS: Chantell Eaton is a 39 y.o. male with a past history remarkable for history of anxiety, allergic rhinitis, GERD, long history of irregular bowel habits with fecal urgency, episodic in nature, previously controlled with Imodium in the past, currently with without any significant response, referred for evaluation of persistent diarrhea and fecal urgency. Patient denies hematochezia, bright blood per rectum, melena. Denies any mucus discharge, denies any proctalgia, does report tenesmus no obvious dietary triggers. No recent endoscopic evaluation. No unintentional weight loss. No significant upper GI symptoms. Smoker: none   Drinking history: Rare  Illicit drugs: none   Abdominal surgeries: none   Prior Colonoscopy: 19 yrs ago   Prior EGD: none   FH of GI issues: Uncle-Colon [place]       Past Medical,Family, and Social History reviewed and does contribute to the patient presentingcondition. Patient's PMH/PSH,SH,PSYCH Hx, MEDs, ALLERGIES, and ROS were all reviewed and updated in the appropriate sections. PAST MEDICAL HISTORY:  Past Medical History:   Diagnosis Date    Allergic rhinitis     Anxiety 10/20/2021    Gastroesophageal reflux disease without esophagitis 10/20/2021       Past Surgical History:   Procedure Laterality Date    TONSILLECTOMY AND ADENOIDECTOMY      pt was 5 or 6- 3930?  WRIST SURGERY Left 2010       CURRENT MEDICATIONS:    Current Outpatient Medications:     diphenoxylate-atropine (DIPHENATOL) 2.5-0.025 MG per tablet, Take 1 tablet by mouth 4 times daily as needed for Diarrhea for up to 10 days. , Disp: 15 tablet, Rfl: 0   loratadine-pseudoephedrine (CLARITIN-D 24 HOUR)  MG per extended release tablet, Take 1 tablet by mouth daily, Disp: 30 tablet, Rfl: 1    polyethylene glycol (GLYCOLAX) 17 GM/SCOOP powder, Take as directed for bowel prep/colonoscopy Instructions given in office, Disp: 289 g, Rfl: 0    bisacodyl (BISACODYL) 5 MG EC tablet, Take as directed for bowel prep/colonoscopy Instructions given in office. , Disp: 4 tablet, Rfl: 0    fexofenadine-pseudoephedrine (ALLEGRA-D 24HR) 180-240 MG per extended release tablet, Take 1 tablet by mouth daily, Disp: 30 tablet, Rfl: 2    dicyclomine (BENTYL) 10 MG capsule, Take 1 capsule by mouth 4 times daily, Disp: 120 capsule, Rfl: 3    omeprazole (PRILOSEC) 20 MG delayed release capsule, Take 1 capsule by mouth Daily, Disp: 30 capsule, Rfl: 3    butalbital-acetaminophen-caffeine (FIORICET, ESGIC) -40 MG per tablet, Take 1-2 tablets by mouth every 8 hours as needed for Headaches or Migraine, Disp: 90 tablet, Rfl: 0    ALLERGIES:   Allergies   Allergen Reactions    Percocet [Oxycodone-Acetaminophen] Itching       FAMILY HISTORY: No family history on file.       SOCIAL HISTORY:   Social History     Socioeconomic History    Marital status:      Spouse name: Not on file    Number of children: Not on file    Years of education: Not on file    Highest education level: Not on file   Occupational History    Not on file   Tobacco Use    Smoking status: Never Smoker    Smokeless tobacco: Never Used   Vaping Use    Vaping Use: Never used   Substance and Sexual Activity    Alcohol use: No     Alcohol/week: 0.0 standard drinks    Drug use: No    Sexual activity: Not on file   Other Topics Concern    Not on file   Social History Narrative    Not on file     Social Determinants of Health     Financial Resource Strain: Low Risk     Difficulty of Paying Living Expenses: Not hard at all   Food Insecurity: No Food Insecurity    Worried About [facility] in the Last Year: Never true    Ran Out of Food in the Last Year: Never true   Transportation Needs:     Lack of Transportation (Medical): Not on file    Lack of Transportation (Non-Medical): Not on file   Physical Activity: Sufficiently Active    Days of Exercise per Week: 5 days    Minutes of Exercise per Session: 60 min   Stress:     Feeling of Stress : Not on file   Social Connections:     Frequency of Communication with Friends and Family: Not on file    Frequency of Social Gatherings with Friends and Family: Not on file    Attends Muslim Services: Not on file    Active Member of Clubs or Organizations: Not on file    Attends Club or Organization Meetings: Not on file    Marital Status: Not on file   Intimate Partner Violence: Not At Risk    Fear of Current or Ex-Partner: No    Emotionally Abused: No    Physically Abused: No    Sexually Abused: No   Housing Stability:     Unable to Pay for Housing in the Last Year: Not on file    Number of [place] in the Last Year: Not on file    Unstable Housing in the Last Year: Not on file         REVIEW OF SYSTEMS: A 12-point review of systems was obtained and pertinent positives and negatives were listed below. REVIEW OF SYSTEMS:     Constitutional: No fever, no chills, no lethargy, no weakness. HEENT:  No headache, otalgia, itchy eyes, nasal discharge or sore throat. Cardiac:  No chest pain, dyspnea, orthopnea or PND. Chest:   No cough, phlegm or wheezing. Abdomen:      Detailed by MA   Neuro:  No focal weakness, abnormal movements or seizure like activity. Skin:   No rashes, no itching. :   No hematuria, no pyuria, no dysuria, no flank pain. Extremities:  No swelling or joint pains. ROS was otherwise negative    Review of Systems    PHYSICAL EXAMINATION: Vital signs reviewed per the nursing documentation.      /82   Pulse 70   Ht 5' 10\" (1.778 m)   Wt 211 lb (95.7 kg)   SpO2 97%   BMI 30.28 kg/m²   Body mass index is 30.28 kg/m². Physical Exam    Physical Exam   Constitutional: Patient is oriented to person, place, and time. Patient appears well-developed and well-nourished. HENT:   Head: Normocephalic and atraumatic. Eyes: Pupils are equal, round, and reactive to light. EOM are normal.   Neck: Normal range of motion. Neck supple. No JVD present. No tracheal deviation present. No thyromegaly present. Cardiovascular: Normal rate, regular rhythm, normal heart sounds and intact distal pulses. Pulmonary/Chest: Effort normal and breath sounds normal. No stridor. No respiratory distress. He has no wheezes. He has no rales. He exhibits no tenderness. Abdominal: Soft. Bowel sounds are normal. He exhibits no distension and no mass. There is no tenderness. There is no rebound and no guarding. No hernia. Musculoskeletal: Normal range of motion. Lymphadenopathy:    Patient has no cervical adenopathy. Neurological: Patient is alert and oriented to person, place, and time. Psychiatric: Patient has a normal mood and affect. Patient behavior is normal.       LABORATORY DATA: Reviewed  Lab Results   Component Value Date    WBC 6.5 10/23/2021    HGB 14.9 10/23/2021    HCT 43.1 10/23/2021    MCV 87.7 10/23/2021     10/23/2021     10/23/2021    K 4.0 10/23/2021     10/23/2021    CO2 26 10/23/2021    BUN 13 10/23/2021    CREATININE 0.80 10/23/2021    LABALBU 4.4 10/23/2021    BILITOT 0.67 10/23/2021    ALKPHOS 82 10/23/2021    AST 16 10/23/2021    ALT 32 10/23/2021         Lab Results   Component Value Date    RBC 4.91 10/23/2021    HGB 14.9 10/23/2021    MCV 87.7 10/23/2021    MCH 30.4 10/23/2021    MCHC 34.6 10/23/2021    RDW 12.7 10/23/2021    MPV 9.1 10/23/2021    BASOPCT 1 10/23/2021    LYMPHSABS 1.90 10/23/2021    MONOSABS 0.60 10/23/2021    NEUTROABS 3.70 10/23/2021    EOSABS 0.30 10/23/2021    BASOSABS 0.00 10/23/2021         DIAGNOSTIC TESTING:     No results found.        IMPRESSION: Mr.Nicholas ERIKA Doyle is a 39 y.o. male with a past history remarkable for history of anxiety, allergic rhinitis, GERD, long history of irregular bowel habits with fecal urgency, episodic in nature, previously controlled with Imodium in the past, currently with without any significant response, referred for evaluation of persistent diarrhea and fecal urgency. Patient denies hematochezia, bright blood per rectum, melena. Denies any mucus discharge, denies any proctalgia, does report tenesmus no obvious dietary triggers. No recent endoscopic evaluation. No unintentional weight loss. No significant upper GI symptoms. Assessment  1. Diarrhea, unspecified type        Marta Lopez was seen today for establish care and diarrhea. Diagnoses and all orders for this visit:    Diarrhea, unspecified type  -     Celiac Disease Panel; Future  -     IBD Panel; Future  -     Food Comprehensive Panel; Future  -     Sedimentation rate, automated; Future  -     C-Reactive Protein; Future  -     EGD; Future  -     COLONOSCOPY (Diagnostic); Future  -     diphenoxylate-atropine (DIPHENATOL) 2.5-0.025 MG per tablet; Take 1 tablet by mouth 4 times daily as needed for Diarrhea for up to 10 days.  -     Pancreatic elastase, fecal; Future  -     Gastrointestinal Panel, Molecular; Future           IBS-D-  This diagnosis only to be considered after exclusion of organic causes, follow-up stool test and endoscopic evaluation. Failure response to Imodium, will provide Lomotil as needed some concern for inflammatory bowel disease given patient's chronic symptoms. Additional comments: Further recommendations until after the procedure initial work-up. Thank you for allowing me to participate in the care of Mr. Jo Huggins. For any further questions please do not hesitate to contact me. I have reviewed and agree with the MA/LPN ROS please refer to their documentation from today's encounter on a separate note.      Arcelia Nieves MD, MPH   Board Certified in Gastroenterology  Board Certified in [address] [place] [address] #: [phone]          this note is created with the assistance of a speech recognition program.  While intending to generate a document that actually reflects the content of the visit, the document can still have some errors including those of syntax and sound a like substitutions which may escape proof reading. It such instances, actual meaning can be extrapolated by contextual diversion.

## 2022-04-14 LAB
C-REACTIVE PROTEIN: <3 MG/L (ref 0–5)
GLIADIN DEAMINIDATED PEPTIDE AB IGA: 2 U/ML
GLIADIN DEAMINIDATED PEPTIDE AB IGG: <0.4 U/ML
IGA: 168 MG/DL (ref 70–400)
SEDIMENTATION RATE, ERYTHROCYTE: 1 MM/HR (ref 0–15)
TISSUE TRANSGLUTAMINASE IGA: 0.3 U/ML

## 2022-04-16 LAB
ALLERGEN BARLEY IGE: 0.29 KU/L (ref 0–0.34)
ALLERGEN BEEF: <0.1 KU/L (ref 0–0.34)
ALLERGEN CABBAGE IGE: <0.1 KU/L (ref 0–0.34)
ALLERGEN CARROT IGE: <0.1 KU/L (ref 0–0.34)
ALLERGEN CHICKEN IGE: <0.1 KU/L (ref 0–0.34)
ALLERGEN CODFISH IGE: <0.1 KU/L (ref 0–0.34)
ALLERGEN CORN IGE: <0.1 KU/L (ref 0–0.34)
ALLERGEN COW MILK IGE: <0.1 KU/L (ref 0–0.34)
ALLERGEN CRAB IGE: 0.91 KU/L (ref 0–0.34)
ALLERGEN EGG WHITE IGE: <0.1 KU/L (ref 0–0.34)
ALLERGEN GRAPE IGE: <0.1 KU/L (ref 0–0.34)
ALLERGEN LETTUCE IGE: <0.1 KU/L (ref 0–0.34)
ALLERGEN NAVY BEAN: <0.1 KU/L (ref 0–0.34)
ALLERGEN OAT: <0.1 KU/L (ref 0–0.34)
ALLERGEN ORANGE IGE: <0.1 KU/L (ref 0–0.34)
ALLERGEN PEANUT (F13) IGE: <0.1 KU/L (ref 0–0.34)
ALLERGEN PEPPER C. ANNUUM IGE: <0.1 KU/L (ref 0–0.34)
ALLERGEN PORK: <0.1 KU/L (ref 0–0.34)
ALLERGEN RICE IGE: <0.1 KU/L (ref 0–0.34)
ALLERGEN RYE IGE: 0.12 KU/L (ref 0–0.34)
ALLERGEN SOYBEAN IGE: <0.1 KU/L (ref 0–0.34)
ALLERGEN TOMATO IGE: <0.1 KU/L (ref 0–0.34)
ALLERGEN TUNA IGE: <0.1 KU/L (ref 0–0.34)
ALLERGEN WHEAT IGE: 0.28 KU/L (ref 0–0.34)
IGE: 109 IU/ML
POTATO, IGE: <0.1 KU/L (ref 0–0.34)
SHRIMP: 1.73 KU/L (ref 0–0.34)

## 2022-04-21 LAB — IBD PANEL: NORMAL

## 2022-04-25 ENCOUNTER — ANESTHESIA EVENT (OUTPATIENT)
Dept: OPERATING ROOM | Age: 37
End: 2022-04-25
Payer: COMMERCIAL

## 2022-04-25 ENCOUNTER — PATIENT MESSAGE (OUTPATIENT)
Dept: GASTROENTEROLOGY | Age: 37
End: 2022-04-25

## 2022-04-25 NOTE — TELEPHONE ENCOUNTER
Patient called back wanting to know if he had to take the whole bottle of Miralax with the gatorade. I advised yes he did. Stated that he is already going clear liquid just with Dulcolax. Advised he still needed to take Miralax/Gatorade mixture.

## 2022-04-26 ENCOUNTER — HOSPITAL ENCOUNTER (OUTPATIENT)
Age: 37
Setting detail: OUTPATIENT SURGERY
Discharge: HOME | End: 2022-04-26
Attending: INTERNAL MEDICINE | Admitting: INTERNAL MEDICINE
Payer: COMMERCIAL

## 2022-04-26 ENCOUNTER — ANESTHESIA (OUTPATIENT)
Dept: OPERATING ROOM | Age: 37
End: 2022-04-26
Payer: COMMERCIAL

## 2022-04-26 VITALS — DIASTOLIC BLOOD PRESSURE: 66 MMHG | OXYGEN SATURATION: 92 % | SYSTOLIC BLOOD PRESSURE: 111 MMHG

## 2022-04-26 VITALS
HEIGHT: 69 IN | DIASTOLIC BLOOD PRESSURE: 84 MMHG | TEMPERATURE: 97.1 F | SYSTOLIC BLOOD PRESSURE: 132 MMHG | OXYGEN SATURATION: 98 % | WEIGHT: 199 LBS | HEART RATE: 73 BPM | BODY MASS INDEX: 29.47 KG/M2 | RESPIRATION RATE: 16 BRPM

## 2022-04-26 PROCEDURE — 2580000003 HC RX 258: Performed by: NURSE ANESTHETIST, CERTIFIED REGISTERED

## 2022-04-26 PROCEDURE — 6360000002 HC RX W HCPCS: Performed by: NURSE ANESTHETIST, CERTIFIED REGISTERED

## 2022-04-26 PROCEDURE — 2500000003 HC RX 250 WO HCPCS: Performed by: NURSE ANESTHETIST, CERTIFIED REGISTERED

## 2022-04-26 PROCEDURE — 7100000010 HC PHASE II RECOVERY - FIRST 15 MIN: Performed by: INTERNAL MEDICINE

## 2022-04-26 PROCEDURE — 43239 EGD BIOPSY SINGLE/MULTIPLE: CPT | Performed by: INTERNAL MEDICINE

## 2022-04-26 PROCEDURE — 3700000001 HC ADD 15 MINUTES (ANESTHESIA): Performed by: INTERNAL MEDICINE

## 2022-04-26 PROCEDURE — 7100000011 HC PHASE II RECOVERY - ADDTL 15 MIN: Performed by: INTERNAL MEDICINE

## 2022-04-26 PROCEDURE — 88305 TISSUE EXAM BY PATHOLOGIST: CPT

## 2022-04-26 PROCEDURE — 2580000003 HC RX 258: Performed by: ANESTHESIOLOGY

## 2022-04-26 PROCEDURE — 2709999900 HC NON-CHARGEABLE SUPPLY: Performed by: INTERNAL MEDICINE

## 2022-04-26 PROCEDURE — 3609012400 HC EGD TRANSORAL BIOPSY SINGLE/MULTIPLE: Performed by: INTERNAL MEDICINE

## 2022-04-26 PROCEDURE — 3609010300 HC COLONOSCOPY W/BIOPSY SINGLE/MULTIPLE: Performed by: INTERNAL MEDICINE

## 2022-04-26 PROCEDURE — 45380 COLONOSCOPY AND BIOPSY: CPT | Performed by: INTERNAL MEDICINE

## 2022-04-26 PROCEDURE — 3700000000 HC ANESTHESIA ATTENDED CARE: Performed by: INTERNAL MEDICINE

## 2022-04-26 RX ORDER — SODIUM CHLORIDE, SODIUM LACTATE, POTASSIUM CHLORIDE, CALCIUM CHLORIDE 600; 310; 30; 20 MG/100ML; MG/100ML; MG/100ML; MG/100ML
INJECTION, SOLUTION INTRAVENOUS CONTINUOUS PRN
Status: DISCONTINUED | OUTPATIENT
Start: 2022-04-26 | End: 2022-04-26 | Stop reason: SDUPTHER

## 2022-04-26 RX ORDER — SODIUM CHLORIDE 0.9 % (FLUSH) 0.9 %
5-40 SYRINGE (ML) INJECTION PRN
Status: DISCONTINUED | OUTPATIENT
Start: 2022-04-26 | End: 2022-04-26 | Stop reason: HOSPADM

## 2022-04-26 RX ORDER — MIDAZOLAM HYDROCHLORIDE 1 MG/ML
INJECTION INTRAMUSCULAR; INTRAVENOUS PRN
Status: DISCONTINUED | OUTPATIENT
Start: 2022-04-26 | End: 2022-04-26 | Stop reason: SDUPTHER

## 2022-04-26 RX ORDER — SODIUM CHLORIDE, SODIUM LACTATE, POTASSIUM CHLORIDE, CALCIUM CHLORIDE 600; 310; 30; 20 MG/100ML; MG/100ML; MG/100ML; MG/100ML
INJECTION, SOLUTION INTRAVENOUS CONTINUOUS
Status: DISCONTINUED | OUTPATIENT
Start: 2022-04-26 | End: 2022-04-26 | Stop reason: HOSPADM

## 2022-04-26 RX ORDER — SODIUM CHLORIDE 9 MG/ML
25 INJECTION, SOLUTION INTRAVENOUS PRN
Status: DISCONTINUED | OUTPATIENT
Start: 2022-04-26 | End: 2022-04-26 | Stop reason: HOSPADM

## 2022-04-26 RX ORDER — SODIUM CHLORIDE 9 MG/ML
INJECTION, SOLUTION INTRAVENOUS PRN
Status: DISCONTINUED | OUTPATIENT
Start: 2022-04-26 | End: 2022-04-26 | Stop reason: HOSPADM

## 2022-04-26 RX ORDER — SODIUM CHLORIDE 0.9 % (FLUSH) 0.9 %
5-40 SYRINGE (ML) INJECTION EVERY 12 HOURS SCHEDULED
Status: DISCONTINUED | OUTPATIENT
Start: 2022-04-26 | End: 2022-04-26 | Stop reason: HOSPADM

## 2022-04-26 RX ORDER — LIDOCAINE HYDROCHLORIDE 10 MG/ML
1 INJECTION, SOLUTION EPIDURAL; INFILTRATION; INTRACAUDAL; PERINEURAL
Status: DISCONTINUED | OUTPATIENT
Start: 2022-04-26 | End: 2022-04-26 | Stop reason: HOSPADM

## 2022-04-26 RX ORDER — DIPHENHYDRAMINE HYDROCHLORIDE 50 MG/ML
12.5 INJECTION INTRAMUSCULAR; INTRAVENOUS
Status: DISCONTINUED | OUTPATIENT
Start: 2022-04-26 | End: 2022-04-26 | Stop reason: HOSPADM

## 2022-04-26 RX ORDER — LIDOCAINE HYDROCHLORIDE 10 MG/ML
INJECTION, SOLUTION EPIDURAL; INFILTRATION; INTRACAUDAL; PERINEURAL PRN
Status: DISCONTINUED | OUTPATIENT
Start: 2022-04-26 | End: 2022-04-26 | Stop reason: SDUPTHER

## 2022-04-26 RX ORDER — MEPERIDINE HYDROCHLORIDE 50 MG/ML
12.5 INJECTION INTRAMUSCULAR; INTRAVENOUS; SUBCUTANEOUS ONCE
Status: DISCONTINUED | OUTPATIENT
Start: 2022-04-26 | End: 2022-04-26 | Stop reason: HOSPADM

## 2022-04-26 RX ORDER — PROPOFOL 10 MG/ML
INJECTION, EMULSION INTRAVENOUS PRN
Status: DISCONTINUED | OUTPATIENT
Start: 2022-04-26 | End: 2022-04-26 | Stop reason: SDUPTHER

## 2022-04-26 RX ORDER — ONDANSETRON 2 MG/ML
4 INJECTION INTRAMUSCULAR; INTRAVENOUS
Status: DISCONTINUED | OUTPATIENT
Start: 2022-04-26 | End: 2022-04-26 | Stop reason: HOSPADM

## 2022-04-26 RX ADMIN — MIDAZOLAM HYDROCHLORIDE 2 MG: 1 INJECTION, SOLUTION INTRAMUSCULAR; INTRAVENOUS at 09:32

## 2022-04-26 RX ADMIN — PROPOFOL 20 MG: 10 INJECTION, EMULSION INTRAVENOUS at 09:46

## 2022-04-26 RX ADMIN — PROPOFOL 100 MG: 10 INJECTION, EMULSION INTRAVENOUS at 09:36

## 2022-04-26 RX ADMIN — PROPOFOL 20 MG: 10 INJECTION, EMULSION INTRAVENOUS at 09:37

## 2022-04-26 RX ADMIN — PROPOFOL 20 MG: 10 INJECTION, EMULSION INTRAVENOUS at 09:41

## 2022-04-26 RX ADMIN — LIDOCAINE HYDROCHLORIDE 50 MG: 10 INJECTION, SOLUTION EPIDURAL; INFILTRATION; INTRACAUDAL; PERINEURAL at 09:35

## 2022-04-26 RX ADMIN — PROPOFOL 20 MG: 10 INJECTION, EMULSION INTRAVENOUS at 09:43

## 2022-04-26 RX ADMIN — SODIUM CHLORIDE, POTASSIUM CHLORIDE, SODIUM LACTATE AND CALCIUM CHLORIDE: 600; 310; 30; 20 INJECTION, SOLUTION INTRAVENOUS at 08:46

## 2022-04-26 RX ADMIN — SODIUM CHLORIDE, POTASSIUM CHLORIDE, SODIUM LACTATE AND CALCIUM CHLORIDE: 600; 310; 30; 20 INJECTION, SOLUTION INTRAVENOUS at 09:32

## 2022-04-26 RX ADMIN — PROPOFOL 20 MG: 10 INJECTION, EMULSION INTRAVENOUS at 09:39

## 2022-04-26 ASSESSMENT — PULMONARY FUNCTION TESTS
PIF_VALUE: 1

## 2022-04-26 ASSESSMENT — PAIN - FUNCTIONAL ASSESSMENT: PAIN_FUNCTIONAL_ASSESSMENT: NONE - DENIES PAIN

## 2022-04-26 NOTE — H&P
Procedure History and Physical    Pre-Procedural Diagnosis:  Diarrhea, evaluation for IBD    Indications:  same    Procedure Planned: endoscopy and colonoscopy     History Obtained From:  patient    HISTORY OF PRESENT ILLNESS:       The patient is a 39 y.o. male who presents for the above procedure. Past Medical History:    Past Medical History:   Diagnosis Date    Allergic rhinitis     Anxiety 10/20/2021    Gastroesophageal reflux disease without esophagitis 10/20/2021       Past Surgical History:    Past Surgical History:   Procedure Laterality Date    TONSILLECTOMY AND ADENOIDECTOMY      pt was 5 or 6- 5347?  WRIST SURGERY Left 2010       Medications:  Current Facility-Administered Medications   Medication Dose Route Frequency Provider Last Rate Last Admin    lidocaine PF 1 % injection 1 mL  1 mL IntraDERmal Once PRN Nasir Meeks MD        lactated ringers infusion   IntraVENous Continuous Nasir Meeks MD        sodium chloride flush 0.9 % injection 5-40 mL  5-40 mL IntraVENous 2 times per day Nasir Meeks MD        sodium chloride flush 0.9 % injection 5-40 mL  5-40 mL IntraVENous PRN Nasir Meeks MD        0.9 % sodium chloride infusion   IntraVENous PRN Nasir Meeks MD           Allergies: Allergies   Allergen Reactions    Percocet [Oxycodone-Acetaminophen] Itching                 Social   Social History     Tobacco Use    Smoking status: Never Smoker    Smokeless tobacco: Never Used   Substance Use Topics    Alcohol use: No     Alcohol/week: 0.0 standard drinks        PSYCH HISTORY:  Depression No  Anxiety No  Suicide No       History reviewed. No pertinent family history. No family history of colon cancer, Crohn's disease, or ulcerative colitis    Problems with Sedation/Anesthesia in the past? no    REVIEW OF SYSTEMS:  12 point review of systems negative other than mentioned above.       PHYSICAL EXAM:    Vitals:  Temp 97.2 °F (36.2 °C) (Infrared)   Ht 5' 9\" (1.753 m)   Wt 199 lb (90.3 kg)   BMI 29.39 kg/m²     Focused Exam related to procedure:    General appearance: NAD, conversant   Eyes: anicteric sclerae, moist conjunctivae; no lid-lag; PERRLA   Lungs: CTA, with normal respiratory effort and no intercostal retractions   CV: RRR, no MRGs   Abdomen: Soft, non-tender; no masses or HSM   Skin: Normal temperature, turgor and texture; no rash, ulcers or subcutaneous nodules     DATA:  CBC:   Lab Results   Component Value Date    WBC 6.5 10/23/2021    HGB 14.9 10/23/2021    HCT 43.1 10/23/2021    MCV 87.7 10/23/2021     10/23/2021     BUN/Cr:   Lab Results   Component Value Date    BUN 13 10/23/2021   ,   Lab Results   Component Value Date    CREATININE 0.80 10/23/2021     Potassium:   Lab Results   Component Value Date    K 4.0 10/23/2021     PT/INR: No results found for: INR, PROTIME    ASSESSMENT AND PLAN:       1. Patient is a 39 y.o. male with above specified procedure planned. Expected Sedation/Anesthesia Type: MAC    2. ASA ([address] Anesthesiology) Anesthesia Status: Class 2 - A normal healthy patient with mild systemic disease    3. Mallampati: II (soft palate, uvula, fauces visible)  4. Procedure options, risks and benefits reviewed with Patient. Patient expresses understanding.     5.  Consent has been signed:  Yes    Andrea Wang MD

## 2022-04-26 NOTE — ANESTHESIA POSTPROCEDURE EVALUATION
POST- ANESTHESIA EVALUATION       Pt Name: Mary Sylvester  MRN: [id number]  [place]: 1985  Date of evaluation: 4/26/2022  Time:  12:08 PM      /84   Pulse 73   Temp 97.1 °F (36.2 °C) (Temporal)   Resp 16   Ht 5' 9\" (1.753 m)   Wt 199 lb (90.3 kg)   SpO2 98%   BMI 29.39 kg/m²      Consciousness Level  Awake  Cardiopulmonary Status  Stable  Pain Adequately Treated YES  Nausea / Vomiting  NO  Adequate Hydration  YES  Anesthesia Related Complications NONE      Electronically signed by Danny Coley MD on 4/26/2022 at 12:08 PM       Department of Anesthesiology  Postprocedure Note    Patient: Mary Sylvester  MRN: [id number]  YOB: 1985  Date of evaluation: 4/26/2022  Time:  12:08 PM     Procedure Summary     Date: 04/26/22 Room / Location: [address] [place] [address]    Anesthesia Start: 5164 Anesthesia Stop: 1009    Procedures:       EGD BIOPSY (N/A )      COLONOSCOPY WITH BIOPSY (N/A ) Diagnosis:       (K30  DYSPEPSIA)      (R19.7  DIARRHEA)    Surgeons: Shirley Ken MD Responsible Provider: Danny Coley MD    Anesthesia Type: MAC ASA Status: 1          Anesthesia Type: MAC    Scarlett Phase I: Scarlett Score: 10    Scarlett Phase II: Scarlett Score: 10    Last vitals: Reviewed and per EMR flowsheets.        Anesthesia Post Evaluation

## 2022-04-26 NOTE — ANESTHESIA PRE PROCEDURE
Department of Anesthesiology  Preprocedure Note       Name:  Mary Sylvester   Age:  39 y.o.  :  1985                                          MRN:  [id number]         Date:  2022      Surgeon: Brandi Dejessu):  Shirley Ken MD    Procedure: Procedure(s):  EGD BIOPSY  COLONOSCOPY DIAGNOSTIC    Medications prior to admission:   Prior to Admission medications    Medication Sig Start Date End Date Taking? Authorizing Provider   loratadine-pseudoephedrine (CLARITIN-D 24 HOUR)  MG per extended release tablet Take 1 tablet by mouth daily 22   Sylwia Gallego, APRN - NP   polyethylene glycol [facility]) 17 GM/SCOOP powder Take as directed for bowel prep/colonoscopy Instructions given in office 22   Shirley Ken MD   bisacodyl (BISACODYL) 5 MG EC tablet Take as directed for bowel prep/colonoscopy Instructions given in office.  22   Shirley Ken MD   fexofenadine-pseudoephedrine (ALLEGRA-D 24HR) 180-240 MG per extended release tablet Take 1 tablet by mouth daily 22   Sylwia Gallego, APRN - NP   dicyclomine (BENTYL) 10 MG capsule Take 1 capsule by mouth 4 times daily 22   Sylwia Gallego, APRN - NP   omeprazole (PRILOSEC) 20 MG delayed release capsule Take 1 capsule by mouth Daily 22   Margoths Erp, APRN - NP   butalbital-acetaminophen-caffeine (FIORICET, ESGIC) -63 MG per tablet Take 1-2 tablets by mouth every 8 hours as needed for Headaches or Migraine 10/2/21   Le Trejo MD       Current medications:    Current Facility-Administered Medications   Medication Dose Route Frequency Provider Last Rate Last Admin    lidocaine PF 1 % injection 1 mL  1 mL IntraDERmal Once PRN Fidelina Herring MD        lactated ringers infusion   IntraVENous Continuous Fidelina Herring MD        sodium chloride flush 0.9 % injection 5-40 mL  5-40 mL IntraVENous 2 times per day Fidelina Herring MD        sodium chloride flush 0.9 % injection 5-40 mL  5-40 mL IntraVENous PRN Geo Behzad Mirza MD        0.9 % sodium chloride infusion   IntraVENous PRN Nelly Mckinney MD           Allergies: Allergies   Allergen Reactions    Percocet [Oxycodone-Acetaminophen] Itching       Problem List:    Patient Active Problem List   Diagnosis Code    Chronic right shoulder pain M25.511, G89.29    Allergic rhinitis J30.9    Anxiety F41.9    Gastroesophageal reflux disease without esophagitis K21.9    Loud snoring R06.83    Frequent nocturnal awakening G47.00       Past Medical History:        Diagnosis Date    Allergic rhinitis     Anxiety 10/20/2021    Gastroesophageal reflux disease without esophagitis 10/20/2021       Past Surgical History:        Procedure Laterality Date    TONSILLECTOMY AND ADENOIDECTOMY      pt was 5 or 6- 0201?  WRIST SURGERY Left 2010       Social History:    Social History     Tobacco Use    Smoking status: Never Smoker    Smokeless tobacco: Never Used   Substance Use Topics    Alcohol use: No     Alcohol/week: 0.0 standard drinks                                Counseling given: Not Answered      Vital Signs (Current):   Vitals:    04/26/22 0829   Temp: 97.2 °F (36.2 °C)   TempSrc: Infrared   Weight: 199 lb (90.3 kg)   Height: 5' 9\" (1.753 m)                                              BP Readings from Last 3 Encounters:   04/13/22 132/82   04/01/22 118/78   10/28/21 124/82       NPO Status:                                                                                 BMI:   Wt Readings from Last 3 Encounters:   04/26/22 199 lb (90.3 kg)   04/13/22 211 lb (95.7 kg)   04/01/22 215 lb (97.5 kg)     Body mass index is 29.39 kg/m².     CBC:   Lab Results   Component Value Date    WBC 6.5 10/23/2021    RBC 4.91 10/23/2021    HGB 14.9 10/23/2021    HCT 43.1 10/23/2021    MCV 87.7 10/23/2021    RDW 12.7 10/23/2021     10/23/2021       CMP:   Lab Results   Component Value Date     10/23/2021    K 4.0 10/23/2021     10/23/2021    CO2 26 10/23/2021    BUN 13 10/23/2021    CREATININE 0.80 10/23/2021    GFRAA >60 10/23/2021    LABGLOM >60 10/23/2021    GLUCOSE 106 10/23/2021    PROT 7.2 10/23/2021    CALCIUM 9.1 10/23/2021    BILITOT 0.67 10/23/2021    ALKPHOS 82 10/23/2021    AST 16 10/23/2021    ALT 32 10/23/2021       POC Tests: No results for input(s): POCGLU, POCNA, POCK, POCCL, POCBUN, POCHEMO, POCHCT in the last 72 hours. Coags: No results found for: PROTIME, INR, APTT    HCG (If Applicable): No results found for: PREGTESTUR, PREGSERUM, HCG, HCGQUANT     ABGs: No results found for: PHART, PO2ART, SYJ5PPO, UTD1NEB, BEART, Z9KFCLRF     Type & Screen (If Applicable):  No results found for: LABABO, LABRH    Drug/Infectious Status (If Applicable):  No results found for: HIV, HEPCAB    COVID-19 Screening (If Applicable): No results found for: COVID19        Anesthesia Evaluation  Patient summary reviewed and Nursing notes reviewed no history of anesthetic complications:   Airway: Mallampati: II  TM distance: >3 FB   Neck ROM: full  Mouth opening: > = 3 FB Dental: normal exam         Pulmonary:Negative Pulmonary ROS and normal exam  breath sounds clear to auscultation                             Cardiovascular:Negative CV ROS            Rhythm: regular  Rate: normal                    Neuro/Psych:   (+) depression/anxiety             GI/Hepatic/Renal:   (+) GERD:, bowel prep,           Endo/Other: Negative Endo/Other ROS                    Abdominal:       Abdomen: soft. Vascular: negative vascular ROS. Other Findings:             Anesthesia Plan      MAC     ASA 1             Anesthetic plan and risks discussed with patient. Plan discussed with CRNA.                   Daniel Mendoza MD   4/26/2022

## 2022-04-26 NOTE — OP NOTE
Operative Note      Patient: Marcy Gonzalez  YOB: 1985  MRN: [id number]    Date of Procedure: 4/26/2022    Pre-Op Diagnosis: K30  DYSPEPSIA  R19.7  DIARRHEA    Post-Op Diagnosis: Mild gastritis, normal appearing colon, FAIR PREP, hemorrhoids       Procedure(s):  EGD BIOPSY  COLONOSCOPY WITH BIOPSY    Surgeon(s):  Robert Romeo MD    Assistant:   * No surgical staff found *    Anesthesia: Monitor Anesthesia Care    Estimated Blood Loss (mL): Minimal    Complications: None    Specimens:   ID Type Source Tests Collected by Time Destination   A : stomach biopsy rule out h.pylori Tissue Stomach SURGICAL PATHOLOGY Robert Romeo MD 4/26/2022 6655    B : distal esophagus biopsy Tissue Esophagus SURGICAL PATHOLOGY Robert Romeo MD 4/26/2022 8339    C : proximal esophagus biopsy Tissue Esophagus SURGICAL PATHOLOGY Robert Romeo MD 4/26/2022 6741    D : random right colon biopsy Tissue Colon SURGICAL PATHOLOGY Robert Romeo MD 4/26/2022 5950    E : random left colon biopsy Tissue Colon SURGICAL PATHOLOGY Robert Romeo MD 4/26/2022 [id number]        Implants:  * No implants in log *      Drains: * No LDAs found *          GASTROENTEROLOGY    [facility]    EGD    PROCEDURE DATE: 04/26/22    REFERRING PHYSICIAN: No ref. provider found     PRIMARY CARE PROVIDER: LILLIE Adhikari - RABIA    ATTENDING PHYSICIAN: Robert Romeo MD     HISTORY: Mr. Marcy Gonzalez is a 39 y.o. male who presents to the  Endoscopy unit for upper endoscopy. The patient's clinical history is remarkable for diarrhea, dyspepsia, referred for EGD and colonoscopy. He is currently medically stable and appropriate for the planned procedure. PREOPERATIVE DIAGNOSIS: Dyspepsia and diarrhea. PROCEDURES:   1) Transoral Upper Endoscopy with cold biopsy. POSTOPERATIVE DIAGNOSIS:     1) Subtle tertiary contractions of the esophagus in the mid-segment vs concentric rings.  Cold biopsy taken of proximal and distal esophagus to evaluate for EoE  2) Mild antritis, no ulcerations, no erosions. Cold biopsy of the stomach taken to evaluate for H. Pylori  3) Normal appearing duodenal mucosa     MEDICATIONS:   MAC per anesthesia     EBL:  <10cc    INSTRUMENT: Olympus GIF-H190 flexible Gastroscope. PREPARATION: The nature and character of the procedure as well as risks, benefits, and alternatives were discussed with the patient and informed consent was obtained. Complications were said to include, but were not limited to: medication allergy, medication reaction, cardiovascular and respiratory problems, bleeding, perforation, infection, and/or missed diagnosis. Following arrival in the endoscopy room, the patient was placed in the left lateral decubitus position and final time-out accomplished in the presence of the nursing staff. Baseline vital signs were obtained and reviewed, and IV sedation was subsequently initiated. FINDINGS:   Esophagus: The esophagus was inspected to the Z-line. The endoscopic exam showed no esophagitis, subtle tertiary contractions. Stomach: The stomach was inspected in both forward and retroflex fashion and was appropriately distensible. The cardia, fundus, incisura, antrum and pylorus were identified via direct visualization. The endoscopic exam showed mild antritis. Duodenum: The proximal small bowel was inspected through the bulb, sweep, and second portion of the duodenum. The endoscopic exam showed normal appearing duodenal mucosa. IMPRESSION:    1) Subtle tertiary contractions of the esophagus in the mid-segment vs concentric rings. Cold biopsy taken of proximal and distal esophagus to evaluate for EoE  2) Mild antritis, no ulcerations, no erosions. Cold biopsy of the stomach taken to evaluate for H. Pylori  3) Normal appearing duodenal mucosa       RECOMMENDATIONS:   1) Follow up path in GI clinic.    2) Proceed with colonoscopy       [facility]  Gastroenterology 04/26/22    this note is created with the assistance of a speech recognition program.  While intending to generate a document that actually reflects the content of the visit, the document can still have some errors including those of syntax and sound a like substitutions which may escape proof reading. It such instances, actual meaning can be extrapolated by contextual diversion. The patient was counseled at length about the risks of tim Covid-19 during their perioperative period and any recovery window from their procedure. The patient was made aware that tim Covid-19  may worsen their prognosis for recovering from their procedure  and lend to a higher morbidity and/or mortality risk. All material risks, benefits, and reasonable alternatives including postponing the procedure were discussed. The patient DOES wish to proceed with the procedure at this time. GASTROENTEROLOGY     [facility]     COLONOSCOPY    PROCEDURE DATE: 04/26/22    REFERRING PHYSICIAN: No ref. provider found     PRIMARY CARE PROVIDER: LILLIE Adhikari NP    ATTENDING PHYSICIAN: Robert Romeo MD     HISTORY: Mr. Marcy Gonzalez is a 39 y.o. male who presents to the  endoscopy unit for colonoscopy. The patient's clinical history is remarkable for history as detailed above. He is currently medically stable and appropriate for the planned procedure. PREOPERATIVE DIAGNOSIS: chronic diarrhea of 5 months duration. PROCEDURES:   Transanal Colonoscopy with biopsy. POSTPROCEDURE DIAGNOSIS:    FAIR to ADEQUATE PREP     1) No evidence of colitis/ileitis, no ulcerations, no erosions, normal vascular pattern and haustral folds  2) Random left and right colon cold biopsies taken to evaluate for microscopic colitis  3) Mild external hemorrhoids    MEDICATIONS:     MAC per anesthesia     EBL <10cc      INSTRUMENT: Olympus CF-H180 AL Pediatric flexible Colonoscope.      PREPARATION: The nature and character of the procedure as well as risks, benefits, and alternatives were discussed with the patient and informed consent was obtained. Complications were said to include, but were not limited to: medication allergy, medication reaction, cardiovascular and respiratory problems, bleeding, perforation, infection, and/or missed diagnosis. Following arrival in the endoscopy room, the patient was placed in the left lateral decubitus position and final time-out accomplished in the presence of the nursing staff. Baseline vital signs were obtained and reviewed, and IV sedation was subsequently initiated. FINDINGS: Rectal examination demonstrated no significant visible external abnormality and digital palpation was unremarkable. Following adequate conscious sedation the colonoscope was introduced and advanced under direct visualization to the cecum, which was identified by the ileocecal valve and appendiceal orifice. The bowel preparation was felt to be FAIR. This included moderate amounts of green, thin and watery stool that was mostly able to be adequately irrigated and aspirated. Cecal intubation time was 8 minutes. Once maximally inserted, the endoscope was withdrawn and the mucosa was carefully inspected. The mucosal exam was revealed normal appearing mucosa. Retroflexion was performed in the rectum and small hemorrhoids. Withdrawal time was 16 minutes. IMPRESSION:     FAIR to ADEQUATE PREP     1) No evidence of colitis/ileitis, no ulcerations, no erosions, normal vascular pattern and haustral folds  2) Random left and right colon cold biopsies taken to evaluate for microscopic colitis  3) Mild external hemorrhoids      RECOMMENDATIONS:   1) Follow up with referring provider, as previously scheduled. 2) Repeat Colonoscopy based on path.  Follow up path in GI clinic         [address] [place] [address]  Gastroenterology   04/26/22    This note is created with the assistance of a speech recognition program.  While intending to generate a document that actually reflects the content of the visit, the document can still have some errors including those of syntax and sound a like substitutions which may escape proof reading. It such instances, actual meaning can be extrapolated by contextual diversion. The patient was counseled at length about the risks of tim Covid-19 during their perioperative period and any recovery window from their procedure. The patient was made aware that tim Covid-19  may worsen their prognosis for recovering from their procedure  and lend to a higher morbidity and/or mortality risk. All material risks, benefits, and reasonable alternatives including postponing the procedure were discussed. The patient DOES wish to proceed with the procedure at this time.       Electronically signed by Patricia Cage MD on 4/26/2022 at 10:06 AM

## 2022-04-27 LAB — SURGICAL PATHOLOGY REPORT: NORMAL

## 2022-05-03 RX ORDER — PANCRELIPASE 36000; 180000; 114000 [USP'U]/1; [USP'U]/1; [USP'U]/1
1 CAPSULE, DELAYED RELEASE PELLETS ORAL
Qty: 180 CAPSULE | Refills: 2 | Status: SHIPPED | OUTPATIENT
Start: 2022-05-03

## 2022-05-06 ENCOUNTER — OFFICE VISIT (OUTPATIENT)
Dept: FAMILY MEDICINE CLINIC | Age: 37
End: 2022-05-06
Payer: COMMERCIAL

## 2022-05-06 ENCOUNTER — PATIENT MESSAGE (OUTPATIENT)
Dept: GASTROENTEROLOGY | Age: 37
End: 2022-05-06

## 2022-05-06 ENCOUNTER — TELEPHONE (OUTPATIENT)
Dept: SURGERY | Age: 37
End: 2022-05-06

## 2022-05-06 VITALS
BODY MASS INDEX: 30.36 KG/M2 | WEIGHT: 205 LBS | SYSTOLIC BLOOD PRESSURE: 124 MMHG | HEIGHT: 69 IN | HEART RATE: 87 BPM | OXYGEN SATURATION: 98 % | DIASTOLIC BLOOD PRESSURE: 80 MMHG | RESPIRATION RATE: 20 BRPM

## 2022-05-06 DIAGNOSIS — F41.9 ANXIETY: Primary | ICD-10-CM

## 2022-05-06 DIAGNOSIS — K58.0 IRRITABLE BOWEL SYNDROME WITH DIARRHEA: ICD-10-CM

## 2022-05-06 PROCEDURE — G8417 CALC BMI ABV UP PARAM F/U: HCPCS | Performed by: NURSE PRACTITIONER

## 2022-05-06 PROCEDURE — 1036F TOBACCO NON-USER: CPT | Performed by: NURSE PRACTITIONER

## 2022-05-06 PROCEDURE — 99213 OFFICE O/P EST LOW 20 MIN: CPT | Performed by: NURSE PRACTITIONER

## 2022-05-06 PROCEDURE — G8427 DOCREV CUR MEDS BY ELIG CLIN: HCPCS | Performed by: NURSE PRACTITIONER

## 2022-05-06 RX ORDER — SERTRALINE HYDROCHLORIDE 25 MG/1
25 TABLET, FILM COATED ORAL DAILY
Qty: 30 TABLET | Refills: 1 | Status: SHIPPED | OUTPATIENT
Start: 2022-05-06 | End: 2022-07-12

## 2022-05-06 ASSESSMENT — COLUMBIA-SUICIDE SEVERITY RATING SCALE - C-SSRS
3. HAVE YOU BEEN THINKING ABOUT HOW YOU MIGHT KILL YOURSELF?: NO
7. DID THIS OCCUR IN THE LAST THREE MONTHS: NO
4. HAVE YOU HAD THESE THOUGHTS AND HAD SOME INTENTION OF ACTING ON THEM?: NO
2. HAVE YOU ACTUALLY HAD ANY THOUGHTS OF KILLING YOURSELF?: NO
5. HAVE YOU STARTED TO WORK OUT OR WORKED OUT THE DETAILS OF HOW TO KILL YOURSELF? DO YOU INTEND TO CARRY OUT THIS PLAN?: NO
1. WITHIN THE PAST MONTH, HAVE YOU WISHED YOU WERE DEAD OR WISHED YOU COULD GO TO SLEEP AND NOT WAKE UP?: NO
6. HAVE YOU EVER DONE ANYTHING, STARTED TO DO ANYTHING, OR PREPARED TO DO ANYTHING TO END YOUR LIFE?: NO

## 2022-05-06 ASSESSMENT — PATIENT HEALTH QUESTIONNAIRE - PHQ9
4. FEELING TIRED OR HAVING LITTLE ENERGY: 0
9. THOUGHTS THAT YOU WOULD BE BETTER OFF DEAD, OR OF HURTING YOURSELF: 0
3. TROUBLE FALLING OR STAYING ASLEEP: 0
2. FEELING DOWN, DEPRESSED OR HOPELESS: 0
SUM OF ALL RESPONSES TO PHQ QUESTIONS 1-9: 0
6. FEELING BAD ABOUT YOURSELF - OR THAT YOU ARE A FAILURE OR HAVE LET YOURSELF OR YOUR FAMILY DOWN: 0
SUM OF ALL RESPONSES TO PHQ QUESTIONS 1-9: 0
5. POOR APPETITE OR OVEREATING: 0
8. MOVING OR SPEAKING SO SLOWLY THAT OTHER PEOPLE COULD HAVE NOTICED. OR THE OPPOSITE, BEING SO FIGETY OR RESTLESS THAT YOU HAVE BEEN MOVING AROUND A LOT MORE THAN USUAL: 0
SUM OF ALL RESPONSES TO PHQ QUESTIONS 1-9: 0
10. IF YOU CHECKED OFF ANY PROBLEMS, HOW DIFFICULT HAVE THESE PROBLEMS MADE IT FOR YOU TO DO YOUR WORK, TAKE CARE OF THINGS AT HOME, OR GET ALONG WITH OTHER PEOPLE: 0
SUM OF ALL RESPONSES TO PHQ9 QUESTIONS 1 & 2: 0
1. LITTLE INTEREST OR PLEASURE IN DOING THINGS: 0
SUM OF ALL RESPONSES TO PHQ QUESTIONS 1-9: 0
7. TROUBLE CONCENTRATING ON THINGS, SUCH AS READING THE NEWSPAPER OR WATCHING TELEVISION: 0

## 2022-05-06 NOTE — TELEPHONE ENCOUNTER
Patient was prescribed Creon on 5/3/22. He is already taking Bentyl and Omeperzole. Does he continue these with the Creon.   Please advise

## 2022-05-06 NOTE — PROGRESS NOTES
Sherri Guevara (:  1985) is a 39 y.o. male,Established patient, here for evaluation of the following chief complaint(s): Anxiety         ASSESSMENT/PLAN:  1. Anxiety  Assessment & Plan:   Uncontrolled, changes made today: start zoloft daily. Side effects reviewed in detail. This medication may cause GI upset, HA, an increase in SI/HI. Should any of these occur please contact the office for further evaluation or seek emergent medical care. Orders:  -     sertraline (ZOLOFT) 25 MG tablet; Take 1 tablet by mouth daily, Disp-30 tablet, R-1Normal  2. Irritable bowel syndrome with diarrhea  Comments:  He will contact GI and clarify Rx for creon. Follow up as scheduled with specialty. Return in about 4 weeks (around 6/3/2022) for Depression/Anxiety. Subjective   SUBJECTIVE/OBJECTIVE:  Presents for follow up of anxiety   Has lost 10lbs since last OV  Appetite has gone down significantly since last visit     GI: Spontaneous diarrhea is gone  Starting yesterday and today feels that BMs are almost back to 'normal', almost 'too much normal'  Completed EGD and colonoscopy with GI  He was given Rx for Creon - he is not quite sure why, he did discuss EPI with GI during last visit   He is only taking Bentyl once a day right now    Anxiety: Stressed at home right now   Wife is working from home, kids are always home - she is falling behind on work   She is also having trouble with her friends, she wanted to check herself into the hospital to get help.  Did not end up going in because she was able to calm herself down   This is ultimately causing a lot of stress within the household   He does not have panic attacks like he used to   Still using atarax, not quite sure if it helps or if it is in his head that it helps  Willing to start daily med to see if this changes things    Denies any other problems/concerns at this time         Review of Systems   Constitutional: Negative for activity change, chills, fatigue and unexpected weight change. HENT: Negative for hearing loss, postnasal drip, sinus pressure and sinus pain. Eyes: Negative for pain and visual disturbance. Respiratory: Negative for chest tightness and shortness of breath. Cardiovascular: Negative for chest pain and palpitations. Gastrointestinal: Positive for diarrhea (Improving). Negative for abdominal pain, constipation, nausea and vomiting. Endocrine: Negative for polydipsia, polyphagia and polyuria. Genitourinary: Negative for dysuria, flank pain, frequency and urgency. Musculoskeletal: Negative for arthralgias, back pain and myalgias. Skin: Negative for color change and rash. Neurological: Negative for dizziness, weakness, light-headedness, numbness and headaches. Psychiatric/Behavioral: Negative for confusion, hallucinations, self-injury, sleep disturbance and suicidal ideas. The patient is nervous/anxious. Objective   Physical Exam  Vitals and nursing note reviewed. Constitutional:       Appearance: Normal appearance. HENT:      Head: Normocephalic. Right Ear: Tympanic membrane, ear canal and external ear normal.      Left Ear: Tympanic membrane, ear canal and external ear normal.      Nose: Nose normal.      Mouth/Throat:      Mouth: Mucous membranes are moist.      Pharynx: Oropharynx is clear. Eyes:      Extraocular Movements: Extraocular movements intact. Conjunctiva/sclera: Conjunctivae normal.      Pupils: Pupils are equal, round, and reactive to light. Cardiovascular:      Rate and Rhythm: Normal rate and regular rhythm. Pulses: Normal pulses. Heart sounds: Normal heart sounds. Pulmonary:      Effort: Pulmonary effort is normal.      Breath sounds: Normal breath sounds. Abdominal:      General: Abdomen is flat. Bowel sounds are normal.      Palpations: Abdomen is soft. Musculoskeletal:         General: Normal range of motion.       Cervical back: Normal range of motion. Skin:     General: Skin is warm and dry. Capillary Refill: Capillary refill takes less than 2 seconds. Neurological:      General: No focal deficit present. Mental Status: He is alert and oriented to person, place, and time. Mental status is at baseline. Psychiatric:         Attention and Perception: Attention and perception normal.         Mood and Affect: Affect normal. Mood is anxious. Speech: Speech normal.         Behavior: Behavior normal. Behavior is cooperative. Thought Content: Thought content normal.         Cognition and Memory: Cognition and memory normal.         Judgment: Judgment normal.              Wt Readings from Last 3 Encounters:   05/06/22 205 lb (93 kg)   04/26/22 199 lb (90.3 kg)   04/13/22 211 lb (95.7 kg)     Temp Readings from Last 3 Encounters:   04/26/22 97.1 °F (36.2 °C) (Temporal)   10/18/21 98 °F (36.7 °C) (Tympanic)   08/25/21 98.1 °F (36.7 °C)     BP Readings from Last 3 Encounters:   05/06/22 124/80   04/26/22 111/66   04/26/22 132/84     Pulse Readings from Last 3 Encounters:   05/06/22 87   04/26/22 73   04/13/22 70           An electronic signature was used to authenticate this note.     --LILLIE Calles NP

## 2022-05-07 NOTE — TELEPHONE ENCOUNTER
Patient contacted Fri afternoon and clarification made regarding medications, also spoke with Dr. Francis Cuevas to explain reason Creon was ordered. Kaci Vital

## 2022-05-07 NOTE — TELEPHONE ENCOUNTER
From: Bela Aquino  To: Dr. Elmo Olszewski: 5/6/2022 4:07 PM EDT  Subject: Question    I need to clarify the new meds you want me to take. Do you want me to stop taking the bentyl as they are both stomach issue meds.  Please and thanks

## 2022-05-09 ENCOUNTER — PATIENT MESSAGE (OUTPATIENT)
Dept: FAMILY MEDICINE CLINIC | Age: 37
End: 2022-05-09

## 2022-05-09 DIAGNOSIS — J30.2 SEASONAL ALLERGIES: ICD-10-CM

## 2022-05-09 ASSESSMENT — ENCOUNTER SYMPTOMS
VOMITING: 0
BACK PAIN: 0
SINUS PRESSURE: 0
EYE PAIN: 0
SHORTNESS OF BREATH: 0
ABDOMINAL PAIN: 0
CONSTIPATION: 0
NAUSEA: 0
CHEST TIGHTNESS: 0
SINUS PAIN: 0
COLOR CHANGE: 0
DIARRHEA: 1

## 2022-05-09 NOTE — TELEPHONE ENCOUNTER
From: Natalie Davis  To: Indra Pugh  Sent: 5/9/2022 8:41 AM EDT  Subject: EPI    So my doctor prescribed me that drug for a diagnosis of EPI. He said my pancreas is not producing any enzymes that break down my food. And he said I can take all of my meds together as they all do something different. My doctor was able to call me on Friday and explain everything since he prescribed me a medicine but didn't call to tell me, seems legit. Had a diagnosis but don't tell the person who has it. Lol have a good week.
No

## 2022-05-09 NOTE — ASSESSMENT & PLAN NOTE
Uncontrolled, changes made today: start zoloft daily. Side effects reviewed in detail. This medication may cause GI upset, HA, an increase in SI/HI. Should any of these occur please contact the office for further evaluation or seek emergent medical care.

## 2022-05-18 DIAGNOSIS — J30.2 SEASONAL ALLERGIES: ICD-10-CM

## 2022-05-18 RX ORDER — FEXOFENADINE HCL AND PSEUDOEPHEDRINE HCI 180; 240 MG/1; MG/1
1 TABLET, EXTENDED RELEASE ORAL DAILY
Qty: 30 TABLET | Refills: 2 | Status: SHIPPED | OUTPATIENT
Start: 2022-05-18 | End: 2022-09-03 | Stop reason: SDUPTHER

## 2022-05-25 RX ORDER — LORATADINE AND PSEUDOEPHEDRINE SULFATE 10; 240 MG/1; MG/1
1 TABLET, EXTENDED RELEASE ORAL DAILY
Qty: 30 TABLET | Refills: 1 | Status: SHIPPED | OUTPATIENT
Start: 2022-05-25 | End: 2022-07-01 | Stop reason: SDUPTHER

## 2022-06-03 ENCOUNTER — PATIENT MESSAGE (OUTPATIENT)
Dept: FAMILY MEDICINE CLINIC | Age: 37
End: 2022-06-03

## 2022-06-03 DIAGNOSIS — J30.2 SEASONAL ALLERGIES: ICD-10-CM

## 2022-06-03 NOTE — TELEPHONE ENCOUNTER
From: Lizeth Delgado  To: Harry Howard  Sent: 6/3/2022 1:59 PM EDT  Subject: Help please    I'm on vacation and hurt my back bad. I didn't think to bring my muscle relaxers as I haven't needed them for months upon end. Can I give you a pharmacy down here to get them filled or like 10 till I can get home. ? I believe it was flexiril was my last meds of them.

## 2022-06-29 ENCOUNTER — OFFICE VISIT (OUTPATIENT)
Dept: GASTROENTEROLOGY | Age: 37
End: 2022-06-29
Payer: COMMERCIAL

## 2022-06-29 VITALS
DIASTOLIC BLOOD PRESSURE: 85 MMHG | SYSTOLIC BLOOD PRESSURE: 136 MMHG | HEART RATE: 73 BPM | BODY MASS INDEX: 29.09 KG/M2 | HEIGHT: 69 IN | WEIGHT: 196.4 LBS

## 2022-06-29 DIAGNOSIS — K86.89 PANCREATIC INSUFFICIENCY: Primary | ICD-10-CM

## 2022-06-29 PROCEDURE — 99213 OFFICE O/P EST LOW 20 MIN: CPT | Performed by: INTERNAL MEDICINE

## 2022-06-29 PROCEDURE — G8427 DOCREV CUR MEDS BY ELIG CLIN: HCPCS | Performed by: INTERNAL MEDICINE

## 2022-06-29 PROCEDURE — G8417 CALC BMI ABV UP PARAM F/U: HCPCS | Performed by: INTERNAL MEDICINE

## 2022-06-29 PROCEDURE — 1036F TOBACCO NON-USER: CPT | Performed by: INTERNAL MEDICINE

## 2022-06-29 RX ORDER — SIMETHICONE 80 MG
80 TABLET,CHEWABLE ORAL 4 TIMES DAILY PRN
Qty: 180 TABLET | Refills: 3 | Status: SHIPPED | OUTPATIENT
Start: 2022-06-29

## 2022-06-29 ASSESSMENT — ENCOUNTER SYMPTOMS
DIARRHEA: 0
WHEEZING: 0
VOICE CHANGE: 0
RECTAL PAIN: 0
APNEA: 0
SORE THROAT: 0
TROUBLE SWALLOWING: 0
SHORTNESS OF BREATH: 0
ANAL BLEEDING: 0
ABDOMINAL DISTENTION: 0
COUGH: 0
COLOR CHANGE: 0
VOMITING: 0
BLOOD IN STOOL: 0
CONSTIPATION: 0
ABDOMINAL PAIN: 0
NAUSEA: 0
CHOKING: 0

## 2022-06-29 NOTE — PROGRESS NOTES
GI FOLLOW UP    INTERVAL HISTORY:     Status post upper endoscopy and colonoscopy  Patient was identified to have a pancreatic insufficiency  Responding well to Creon therapy  Reported increased amount of flatus  Few episodes of fecal urgency but significantly much improved    Chief Complaint   Patient presents with    Follow-up    EGD    Colonoscopy       1. Pancreatic insufficiency          HISTORY OF PRESENT ILLNESS: Stephany Awad is a 39 y.o. male with a past history remarkable for history of anxiety, allergic rhinitis, GERD, long history of irregular bowel habits with fecal urgency, episodic in nature, previously controlled with Imodium in the past, currently with without any significant response, referred for evaluation of persistent diarrhea and fecal urgency. Patient denies hematochezia, bright blood per rectum, melena. Denies any mucus discharge, denies any proctalgia, does report tenesmus no obvious dietary triggers. No recent endoscopic evaluation. No unintentional weight loss. No significant upper GI symptoms.        Smoker: none   Drinking history: Rare  Illicit drugs: none   Abdominal surgeries: none   Prior Colonoscopy: 19 yrs ago   Prior EGD: none   FH of GI issues: Uncle-Colon CA     Past Medical,Family, and Social History reviewed and does contribute to the patient presenting condition. Patient's PMH/PSH,SH,PSYCH Hx, MEDs, ALLERGIES, and ROS were all reviewed and updated in the appropriate sections.     PAST MEDICAL HISTORY:  Past Medical History:   Diagnosis Date    Allergic rhinitis     Anxiety 10/20/2021    Gastroesophageal reflux disease without esophagitis 10/20/2021       Past Surgical History:   Procedure Laterality Date    COLONOSCOPY N/A 4/26/2022    COLONOSCOPY WITH BIOPSY performed by Patricia Cage MD at [address] [place] [address] ADENOIDECTOMY      pt was 6 or 7- 1192?  UPPER GASTROINTESTINAL ENDOSCOPY N/A 04/26/2022    EGD BIOPSY (N/A ) COLONOSCOPY DIAGNOSTIC (N/A)    UPPER GASTROINTESTINAL ENDOSCOPY N/A 4/26/2022    EGD BIOPSY performed by Patricia Cage MD at [facility] Left 2010       CURRENT MEDICATIONS:    Current Outpatient Medications:     fexofenadine-pseudoephedrine (ALLEGRA-D 24HR) 180-240 MG per extended release tablet, Take 1 tablet by mouth daily, Disp: 30 tablet, Rfl: 2    sertraline (ZOLOFT) 25 MG tablet, Take 1 tablet by mouth daily, Disp: 30 tablet, Rfl: 1    lipase-protease-amylase (CREON) 12349-482374 units CPEP delayed release capsule, Take 1 capsule by mouth 3 times daily (with meals), Disp: 180 capsule, Rfl: 2    dicyclomine (BENTYL) 10 MG capsule, Take 1 capsule by mouth 4 times daily (Patient taking differently: Take 10 mg by mouth 4 times daily PRN), Disp: 120 capsule, Rfl: 3    omeprazole (PRILOSEC) 20 MG delayed release capsule, Take 1 capsule by mouth Daily, Disp: 30 capsule, Rfl: 3    butalbital-acetaminophen-caffeine (FIORICET, ESGIC) -40 MG per tablet, Take 1-2 tablets by mouth every 8 hours as needed for Headaches or Migraine, Disp: 90 tablet, Rfl: 0    loratadine-pseudoephedrine (CLARITIN-D 24 HOUR)  MG per extended release tablet, Take 1 tablet by mouth daily (Patient not taking: Reported on 6/29/2022), Disp: 30 tablet, Rfl: 1    ALLERGIES:   Allergies   Allergen Reactions    Percocet [Oxycodone-Acetaminophen] Itching       FAMILY HISTORY: No family history on file.       SOCIAL HISTORY:   Social History     Socioeconomic History    Marital status:      Spouse name: Not on file    Number of children: Not on file    Years of education: Not on file    Highest education level: Not on file   Occupational History    Not on file   Tobacco Use    Smoking status: Never Smoker    Smokeless tobacco: Never Used   Vaping Use    Vaping Use: Never used Substance and Sexual Activity    Alcohol use: No     Alcohol/week: 0.0 standard drinks    Drug use: No    Sexual activity: Not on file   Other Topics Concern    Not on file   Social History Narrative    Not on file     Social Determinants of Health     Financial Resource Strain: Low Risk     Difficulty of Paying Living Expenses: Not hard at all   Food Insecurity: No Food Insecurity    Worried About Running Out of Food in the Last Year: Never true    [address] in the Last Year: Never true   Transportation Needs:     Lack of Transportation (Medical): Not on file    Lack of Transportation (Non-Medical): Not on file   Physical Activity: Sufficiently Active    Days of Exercise per Week: 5 days    Minutes of Exercise per Session: 60 min   Stress:     Feeling of Stress : Not on file   Social Connections:     Frequency of Communication with Friends and Family: Not on file    Frequency of Social Gatherings with Friends and Family: Not on file    Attends Oriental orthodox Services: Not on file    Active Member of Clubs or Organizations: Not on file    Attends Club or Organization Meetings: Not on file    Marital Status: Not on file   Intimate Partner Violence: Not At Risk    Fear of Current or Ex-Partner: No    Emotionally Abused: No    Physically Abused: No    Sexually Abused: No   Housing Stability:     Unable to Pay for Housing in the Last Year: Not on file    Number of [place] in the Last Year: Not on file    Unstable Housing in the Last Year: Not on file       REVIEW OF SYSTEMS: A 12-point review of systems was obtained and pertinent positives and negatives were listed below. REVIEW OF SYSTEMS:     Constitutional: No fever, no chills, no lethargy, no weakness. HEENT:  No headache, otalgia, itchy eyes, nasal discharge or sore throat. Cardiac:  No chest pain, dyspnea, orthopnea or PND. Chest:   No cough, phlegm or wheezing.   Abdomen:      Detailed by MA   Neuro:  No focal weakness, abnormal movements or seizure like activity. Skin:   No rashes, no itching. :   No hematuria, no pyuria, no dysuria, no flank pain. Extremities:  No swelling or joint pains. ROS was otherwise negative    Review of Systems   Constitutional: Negative for appetite change, fatigue, fever and unexpected weight change. HENT: Negative for sore throat, trouble swallowing and voice change. Eyes: Negative for visual disturbance. Respiratory: Negative for apnea, cough, choking, shortness of breath and wheezing. Cardiovascular: Negative for chest pain, palpitations and leg swelling. Gastrointestinal: Negative for abdominal distention, abdominal pain, anal bleeding, blood in stool, constipation, diarrhea, nausea, rectal pain and vomiting. Genitourinary: Negative for difficulty urinating. Skin: Negative for color change and rash. Neurological: Negative for dizziness, seizures, weakness, light-headedness, numbness and headaches. Hematological: Does not bruise/bleed easily. Psychiatric/Behavioral: Negative for confusion and sleep disturbance. The patient is not nervous/anxious. PHYSICAL EXAMINATION: Vital signs reviewed per the nursing documentation. /85   Pulse 73   Ht 5' 9\" (1.753 m)   Wt 196 lb 6.4 oz (89.1 kg)   BMI 29.00 kg/m²   Body mass index is 29 kg/m². Physical Exam    Physical Exam   Constitutional: Patient is oriented to person, place, and time. Patient appears well-developed and well-nourished. HENT:   Head: Normocephalic and atraumatic. Eyes: Pupils are equal, round, and reactive to light. EOM are normal.   Neck: Normal range of motion. Neck supple. No JVD present. No tracheal deviation present. No thyromegaly present. Cardiovascular: Normal rate, regular rhythm, normal heart sounds and intact distal pulses. Pulmonary/Chest: Effort normal and breath sounds normal. No stridor. No respiratory distress. He has no wheezes. He has no rales.  He exhibits no tenderness. Abdominal: Soft. Bowel sounds are normal. He exhibits no distension and no mass. There is no tenderness. There is no rebound and no guarding. No hernia. Musculoskeletal: Normal range of motion. Lymphadenopathy:    Patient has no cervical adenopathy. Neurological: Patient is alert and oriented to person, place, and time. Psychiatric: Patient has a normal mood and affect. Patient behavior is normal.       LABORATORY DATA: Reviewed  Lab Results   Component Value Date    WBC 6.5 10/23/2021    HGB 14.9 10/23/2021    HCT 43.1 10/23/2021    MCV 87.7 10/23/2021     10/23/2021     10/23/2021    K 4.0 10/23/2021     10/23/2021    CO2 26 10/23/2021    BUN 13 10/23/2021    CREATININE 0.80 10/23/2021    LABALBU 4.4 10/23/2021    BILITOT 0.67 10/23/2021    ALKPHOS 82 10/23/2021    AST 16 10/23/2021    ALT 32 10/23/2021         Lab Results   Component Value Date    RBC 4.91 10/23/2021    HGB 14.9 10/23/2021    MCV 87.7 10/23/2021    MCH 30.4 10/23/2021    MCHC 34.6 10/23/2021    RDW 12.7 10/23/2021    MPV 9.1 10/23/2021    BASOPCT 1 10/23/2021    LYMPHSABS 1.90 10/23/2021    MONOSABS 0.60 10/23/2021    NEUTROABS 3.70 10/23/2021    EOSABS 0.30 10/23/2021    BASOSABS 0.00 10/23/2021         DIAGNOSTIC TESTING:     No results found. IMPRESSION: Chantell Eaton is a 39 y.o. male with a past history remarkable for history of anxiety, allergic rhinitis, GERD, long history of irregular bowel habits with fecal urgency, episodic in nature, previously controlled with Imodium in the past, currently with without any significant response, referred for evaluation of persistent diarrhea and fecal urgency. Patient denies hematochezia, bright blood per rectum, melena. Denies any mucus discharge, denies any proctalgia, does report tenesmus no obvious dietary triggers. No recent endoscopic evaluation. No unintentional weight loss. No significant upper GI symptoms.         Assessment  1. Pancreatic insufficiency        Germania Dc was seen today for follow-up, egd and colonoscopy. Diagnoses and all orders for this visit:    Pancreatic insufficiency- patient currently on Creon 36,000 214,000 units before every meal, will reduce to twice daily dosing given the patient's lack of morning time and afternoon meals. Simethicone to be added to the patient's regiment as an antigas medication. Patient advised to avoid any gas producing foods. Monitor clinical response. Overall, clinically much improved. RTC: 3 months. Additional comments: Thank you for allowing me to participate in the care of Mr. Rufus Cameron. For any further questions please do not hesitate to contact me. I have reviewed and agree with the ROS entered by the MA/LPN from today's encounter documented in a separate note. Luzmaria Olmstead MD, MPH   Board Certified in Gastroenterology  Board Certified in [address] [place] [address] #: [phone]    this note is created with the assistance of a speech recognition program.  While intending to generate a document that actually reflects the content of the visit, the document can still have some errors including those of syntax and sound a like substitutions which may escape proof reading. It such instances, actual meaning can be extrapolated by contextual diversion.

## 2022-07-01 RX ORDER — LORATADINE AND PSEUDOEPHEDRINE SULFATE 10; 240 MG/1; MG/1
1 TABLET, EXTENDED RELEASE ORAL DAILY
Qty: 30 TABLET | Refills: 5 | Status: SHIPPED | OUTPATIENT
Start: 2022-07-01

## 2022-07-12 DIAGNOSIS — F41.9 ANXIETY: ICD-10-CM

## 2022-07-12 RX ORDER — SERTRALINE HYDROCHLORIDE 25 MG/1
TABLET, FILM COATED ORAL
Qty: 90 TABLET | Refills: 1 | Status: SHIPPED | OUTPATIENT
Start: 2022-07-12

## 2022-07-12 NOTE — TELEPHONE ENCOUNTER
Last visit: 5/6/22  Last Med refill: 5/6/22  Does patient have enough medication for 72 hours: No:     Next Visit Date:  Future Appointments   Date Time Provider Valencia Shah   10/5/2022  3:45 PM Levar Dela Cruz MD [facility]   10/27/2022  4:45 PM Thomas Bowman APRN -  [facility]   Topic Date Due    Varicella vaccine (1 of 2 - 2-dose childhood series) Never done    COVID-19 Vaccine (1) Never done    HIV screen  Never done    Hepatitis C screen  Never done    Flu vaccine (1) 09/01/2022    Depression Screen  05/06/2023    Diabetes screen  04/01/2025    DTaP/Tdap/Td vaccine (2 - Td or Tdap) 08/25/2031    Hepatitis A vaccine  Aged Out    Hepatitis B vaccine  Aged Out    Hib vaccine  Aged Out    Meningococcal (ACWY) vaccine  Aged Out    Pneumococcal 0-64 years Vaccine  Aged Out       Hemoglobin A1C (%)   Date Value   04/01/2022 5.5             ( goal A1C is < 7)   No results found for: LABMICR  LDL Cholesterol (mg/dL)   Date Value   10/23/2021 111       (goal LDL is <100)   AST (U/L)   Date Value   10/23/2021 16     ALT (U/L)   Date Value   10/23/2021 32     BUN (mg/dL)   Date Value   10/23/2021 13     BP Readings from Last 3 Encounters:   06/29/22 136/85   05/06/22 124/80   04/26/22 111/66          (goal 120/80)    All Future Testing planned in [facility]  Lab Frequency Next Occurrence   EGD Once 04/13/2022   COLONOSCOPY (Diagnostic) Once 04/13/2022   Pancreatic elastase, fecal Once 04/13/2022   Gastrointestinal Panel, Molecular Once 04/13/2022               Patient Active Problem List:     Chronic right shoulder pain     Allergic rhinitis     Anxiety     Gastroesophageal reflux disease without esophagitis     Loud snoring     Frequent nocturnal awakening     Dyspepsia     Diarrhea     Gastritis without bleeding

## 2022-08-23 DIAGNOSIS — K21.9 GASTROESOPHAGEAL REFLUX DISEASE WITHOUT ESOPHAGITIS: ICD-10-CM

## 2022-08-23 NOTE — TELEPHONE ENCOUNTER
Last visit: 05/06/2022  Last Med refill: 07/23/2022  Does patient have enough medication for 72 hours: No: last fill was 30 caps only    Next Visit Date:  Future Appointments   Date Time Provider Valencia Alyssa   10/5/2022  3:45 PM Michael Bolanos MD [facility]   10/27/2022  4:45 PM Cecilia Roberts APRN -  [facility]   Topic Date Due    COVID-19 Vaccine (1) Never done    Varicella vaccine (1 of 2 - 2-dose childhood series) Never done    HIV screen  Never done    Hepatitis C screen  Never done    Flu vaccine (1) 09/01/2022    Depression Screen  05/06/2023    Diabetes screen  04/01/2025    DTaP/Tdap/Td vaccine (2 - Td or Tdap) 08/25/2031    Hepatitis A vaccine  Aged Out    Hepatitis B vaccine  Aged Out    Hib vaccine  Aged Out    Meningococcal (ACWY) vaccine  Aged Out    Pneumococcal 0-64 years Vaccine  Aged Out       Hemoglobin A1C (%)   Date Value   04/01/2022 5.5             ( goal A1C is < 7)   No results found for: LABMICR  LDL Cholesterol (mg/dL)   Date Value   10/23/2021 111       (goal LDL is <100)   AST (U/L)   Date Value   10/23/2021 16     ALT (U/L)   Date Value   10/23/2021 32     BUN (mg/dL)   Date Value   10/23/2021 13     BP Readings from Last 3 Encounters:   06/29/22 136/85   05/06/22 124/80   04/26/22 111/66          (goal 120/80)    All Future Testing planned in [facility]  Lab Frequency Next Occurrence   EGD Once 04/13/2022   COLONOSCOPY (Diagnostic) Once 04/13/2022   Pancreatic elastase, fecal Once 04/13/2022   Gastrointestinal Panel, Molecular Once 04/13/2022               Patient Active Problem List:     Chronic right shoulder pain     Allergic rhinitis     Anxiety     Gastroesophageal reflux disease without esophagitis     Loud snoring     Frequent nocturnal awakening     Dyspepsia     Diarrhea     Gastritis without bleeding

## 2022-08-24 RX ORDER — OMEPRAZOLE 20 MG/1
CAPSULE, DELAYED RELEASE ORAL
Qty: 90 CAPSULE | Refills: 1 | Status: SHIPPED | OUTPATIENT
Start: 2022-08-24

## 2022-09-03 DIAGNOSIS — J30.2 SEASONAL ALLERGIES: ICD-10-CM

## 2022-09-06 NOTE — TELEPHONE ENCOUNTER
Last visit: 05/06/2022  Last Med refill: 7/18/2022  Does patient have enough medication for 72 hours: No:     Next Visit Date:  Future Appointments   Date Time Provider Valencia Shah   10/5/2022  3:45 PM Tahir Doe MD [facility]   10/27/2022  4:45 PM Sharda Luevano APRN -  [facility]   Topic Date Due    COVID-19 Vaccine (1) Never done    Varicella vaccine (1 of 2 - 2-dose childhood series) Never done    HIV screen  Never done    Hepatitis C screen  Never done    Flu vaccine (1) Never done    Depression Screen  05/06/2023    Diabetes screen  04/01/2025    DTaP/Tdap/Td vaccine (2 - Td or Tdap) 08/25/2031    Hepatitis A vaccine  Aged Out    Hepatitis B vaccine  Aged Out    Hib vaccine  Aged Out    Meningococcal (ACWY) vaccine  Aged Out    Pneumococcal 0-64 years Vaccine  Aged Out       Hemoglobin A1C (%)   Date Value   04/01/2022 5.5             ( goal A1C is < 7)   No results found for: LABMICR  LDL Cholesterol (mg/dL)   Date Value   10/23/2021 111       (goal LDL is <100)   AST (U/L)   Date Value   10/23/2021 16     ALT (U/L)   Date Value   10/23/2021 32     BUN (mg/dL)   Date Value   10/23/2021 13     BP Readings from Last 3 Encounters:   06/29/22 136/85   05/06/22 124/80   04/26/22 111/66          (goal 120/80)    All Future Testing planned in [facility]  Lab Frequency Next Occurrence   EGD Once 04/13/2022   COLONOSCOPY (Diagnostic) Once 04/13/2022   Pancreatic elastase, fecal Once 04/13/2022   Gastrointestinal Panel, Molecular Once 04/13/2022               Patient Active Problem List:     Chronic right shoulder pain     Allergic rhinitis     Anxiety     Gastroesophageal reflux disease without esophagitis     Loud snoring     Frequent nocturnal awakening     Dyspepsia     Diarrhea     Gastritis without bleeding           05/06/2022

## 2022-09-07 RX ORDER — FEXOFENADINE HCL AND PSEUDOEPHEDRINE HCI 180; 240 MG/1; MG/1
1 TABLET, EXTENDED RELEASE ORAL DAILY
Qty: 90 TABLET | Refills: 1 | Status: SHIPPED | OUTPATIENT
Start: 2022-09-07

## 2022-09-13 ENCOUNTER — HOSPITAL ENCOUNTER (OUTPATIENT)
Dept: GENERAL RADIOLOGY | Age: 37
Discharge: HOME | End: 2022-09-15
Payer: COMMERCIAL

## 2022-09-13 ENCOUNTER — HOSPITAL ENCOUNTER (OUTPATIENT)
Age: 37
Discharge: HOME | End: 2022-09-15
Payer: COMMERCIAL

## 2022-09-13 DIAGNOSIS — N20.0 KIDNEY STONE ON RIGHT SIDE: ICD-10-CM

## 2022-09-13 PROCEDURE — 74018 RADEX ABDOMEN 1 VIEW: CPT

## 2022-10-06 ENCOUNTER — PATIENT MESSAGE (OUTPATIENT)
Dept: FAMILY MEDICINE CLINIC | Age: 37
End: 2022-10-06

## 2022-10-06 DIAGNOSIS — S16.1XXA STRAIN OF NECK MUSCLE, INITIAL ENCOUNTER: Primary | ICD-10-CM
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
## 2022-10-07 RX ORDER — TIZANIDINE 4 MG/1
4 TABLET ORAL 2 TIMES DAILY PRN
Qty: 20 TABLET | Refills: 0 | Status: SHIPPED | OUTPATIENT
Start: 2022-10-07 | End: 2022-10-17

## 2022-10-12 RX ORDER — METHYLPREDNISOLONE 4 MG/1
TABLET ORAL
Qty: 1 KIT | Refills: 0 | Status: SHIPPED | OUTPATIENT
Start: 2022-10-12 | End: 2022-10-18

## 2022-10-27 DIAGNOSIS — R19.7 DIARRHEA, UNSPECIFIED TYPE: ICD-10-CM

## 2022-11-10 ENCOUNTER — OFFICE VISIT (OUTPATIENT)
Dept: FAMILY MEDICINE CLINIC | Age: 37
End: 2022-11-10
Payer: COMMERCIAL

## 2022-11-10 VITALS
SYSTOLIC BLOOD PRESSURE: 118 MMHG | DIASTOLIC BLOOD PRESSURE: 80 MMHG | HEIGHT: 69 IN | WEIGHT: 193.8 LBS | HEART RATE: 75 BPM | RESPIRATION RATE: 22 BRPM | BODY MASS INDEX: 28.71 KG/M2 | OXYGEN SATURATION: 96 %

## 2022-11-10 DIAGNOSIS — Z00.00 ADULT GENERAL MEDICAL EXAMINATION: Primary | ICD-10-CM

## 2022-11-10 DIAGNOSIS — G89.29 CHRONIC NECK PAIN: ICD-10-CM

## 2022-11-10 DIAGNOSIS — M54.2 CHRONIC NECK PAIN: ICD-10-CM

## 2022-11-10 PROCEDURE — 99395 PREV VISIT EST AGE 18-39: CPT | Performed by: NURSE PRACTITIONER

## 2022-11-10 PROCEDURE — G8484 FLU IMMUNIZE NO ADMIN: HCPCS | Performed by: NURSE PRACTITIONER

## 2022-11-10 RX ORDER — BACLOFEN 10 MG/1
10 TABLET ORAL 2 TIMES DAILY
Qty: 20 TABLET | Refills: 0 | Status: SHIPPED | OUTPATIENT
Start: 2022-11-10 | End: 2022-11-20

## 2022-11-10 ASSESSMENT — ENCOUNTER SYMPTOMS
COLOR CHANGE: 0
EYE PAIN: 0
NAUSEA: 0
CONSTIPATION: 0
SHORTNESS OF BREATH: 0
BACK PAIN: 0
DIARRHEA: 0
SINUS PAIN: 0
SINUS PRESSURE: 0
CHEST TIGHTNESS: 0
VOMITING: 0
ABDOMINAL PAIN: 0
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
## 2022-12-02 ENCOUNTER — PATIENT MESSAGE (OUTPATIENT)
Dept: FAMILY MEDICINE CLINIC | Age: 37
End: 2022-12-02

## 2022-12-02 NOTE — TELEPHONE ENCOUNTER
From: Estefany Hopkins  To: Minnie Chacon  Sent: 12/2/2022 9:33 AM EST  Subject: What to do    So I don't think this medicine you have me on for depression is helping. I believe it's sertraline and I want to ween off this and get signed up for therapy and maybe some anger management classes as I am flipping out at such small little things any more and my family is walking on egg shells around me I have been told and I don't need my family being afraid of me. So what's your thoughts and or help please and thank you.

## 2023-01-29 DIAGNOSIS — K21.9 GASTROESOPHAGEAL REFLUX DISEASE WITHOUT ESOPHAGITIS: ICD-10-CM

## 2023-01-29 DIAGNOSIS — J30.2 SEASONAL ALLERGIES: ICD-10-CM

## 2023-01-30 RX ORDER — OMEPRAZOLE 20 MG/1
CAPSULE, DELAYED RELEASE ORAL
Qty: 90 CAPSULE | Refills: 1 | Status: SHIPPED | OUTPATIENT
Start: 2023-01-30

## 2023-01-30 RX ORDER — LORATADINE AND PSEUDOEPHEDRINE SULFATE 10; 240 MG/1; MG/1
TABLET, FILM COATED, EXTENDED RELEASE ORAL
Qty: 90 TABLET | Refills: 1 | Status: SHIPPED | OUTPATIENT
Start: 2023-01-30

## 2023-01-30 NOTE — TELEPHONE ENCOUNTER
Last visit: 11/10/22  Last Med refill: 12/21/22  Does patient have enough medication for 72 hours: Yes    Next Visit Date:  Future Appointments   Date Time Provider Valencia Shah   5/11/2023  4:45 PM LILLIE Lozano  [facility]   Topic Date Due    Flu vaccine (1) 11/10/2023 (Originally 8/1/2022)    COVID-19 Vaccine (1) 11/10/2023 (Originally [birth year]/1985)    Hepatitis C screen  11/10/2023 (Originally 12/3/2002)    Depression Screen  05/06/2023    Diabetes screen  04/01/2025    DTaP/Tdap/Td vaccine (2 - Td or Tdap) 08/25/2031    Hepatitis A vaccine  Aged Out    Hib vaccine  Aged Out    Meningococcal (ACWY) vaccine  Aged Out    Pneumococcal 0-64 years Vaccine  Aged Out    Varicella vaccine  Discontinued    HIV screen  Discontinued       Hemoglobin A1C (%)   Date Value   04/01/2022 5.5             ( goal A1C is < 7)   No results found for: LABMICR  LDL Cholesterol (mg/dL)   Date Value   10/23/2021 111       (goal LDL is <100)   AST (U/L)   Date Value   10/23/2021 16     ALT (U/L)   Date Value   10/23/2021 32     BUN (mg/dL)   Date Value   10/23/2021 13     BP Readings from Last 3 Encounters:   11/10/22 118/80   06/29/22 136/85   05/06/22 124/80          (goal 120/80)    All Future Testing planned in [facility]  Lab Frequency Next Occurrence   EGD Once 04/13/2022   Gastrointestinal Panel, Molecular Once 04/13/2022   XR CERVICAL SPINE (2-3 VIEWS) Once 11/10/2022               Patient Active Problem List:     Chronic right shoulder pain     Allergic rhinitis     Anxiety     Gastroesophageal reflux disease without esophagitis     Loud snoring     Frequent nocturnal awakening     Dyspepsia     Diarrhea     Gastritis without bleeding

## 2023-05-11 ENCOUNTER — OFFICE VISIT (OUTPATIENT)
Dept: FAMILY MEDICINE CLINIC | Age: 38
End: 2023-05-11
Payer: COMMERCIAL

## 2023-05-11 VITALS
SYSTOLIC BLOOD PRESSURE: 118 MMHG | BODY MASS INDEX: 28.29 KG/M2 | HEIGHT: 69 IN | HEART RATE: 96 BPM | DIASTOLIC BLOOD PRESSURE: 78 MMHG | OXYGEN SATURATION: 99 % | WEIGHT: 191 LBS | RESPIRATION RATE: 20 BRPM

## 2023-05-11 DIAGNOSIS — J30.2 SEASONAL ALLERGIES: ICD-10-CM

## 2023-05-11 DIAGNOSIS — K21.9 GASTROESOPHAGEAL REFLUX DISEASE WITHOUT ESOPHAGITIS: ICD-10-CM

## 2023-05-11 DIAGNOSIS — G44.209 ACUTE NON INTRACTABLE TENSION-TYPE HEADACHE: ICD-10-CM

## 2023-05-11 DIAGNOSIS — M54.2 NECK PAIN: ICD-10-CM

## 2023-05-11 DIAGNOSIS — F41.9 ANXIETY: Primary | ICD-10-CM

## 2023-05-11 PROCEDURE — 99214 OFFICE O/P EST MOD 30 MIN: CPT | Performed by: NURSE PRACTITIONER

## 2023-05-11 RX ORDER — OMEPRAZOLE 20 MG/1
CAPSULE, DELAYED RELEASE ORAL
Qty: 90 CAPSULE | Refills: 1 | Status: SHIPPED | OUTPATIENT
Start: 2023-05-11

## 2023-05-11 RX ORDER — BUTALBITAL, ACETAMINOPHEN AND CAFFEINE 50; 325; 40 MG/1; MG/1; MG/1
TABLET ORAL
Qty: 90 TABLET | Refills: 0 | Status: CANCELLED | OUTPATIENT
Start: 2023-05-11

## 2023-05-11 RX ORDER — FEXOFENADINE HCL AND PSEUDOEPHEDRINE HCI 180; 240 MG/1; MG/1
1 TABLET, EXTENDED RELEASE ORAL DAILY
Qty: 90 TABLET | Refills: 1 | Status: SHIPPED | OUTPATIENT
Start: 2023-05-11

## 2023-05-11 SDOH — ECONOMIC STABILITY: INCOME INSECURITY: HOW HARD IS IT FOR YOU TO PAY FOR THE VERY BASICS LIKE FOOD, HOUSING, MEDICAL CARE, AND HEATING?: NOT HARD AT ALL

## 2023-05-11 SDOH — ECONOMIC STABILITY: FOOD INSECURITY: WITHIN THE PAST 12 MONTHS, YOU WORRIED THAT YOUR FOOD WOULD RUN OUT BEFORE YOU GOT MONEY TO BUY MORE.: NEVER TRUE

## 2023-05-11 SDOH — ECONOMIC STABILITY: HOUSING INSECURITY
IN THE LAST 12 MONTHS, WAS THERE A TIME WHEN YOU DID NOT HAVE A STEADY PLACE TO SLEEP OR SLEPT IN A SHELTER (INCLUDING NOW)?: NO

## 2023-05-11 SDOH — ECONOMIC STABILITY: FOOD INSECURITY: WITHIN THE PAST 12 MONTHS, THE FOOD YOU BOUGHT JUST DIDN'T LAST AND YOU DIDN'T HAVE MONEY TO GET MORE.: NEVER TRUE

## 2023-05-11 ASSESSMENT — ENCOUNTER SYMPTOMS
EYE PAIN: 0
ABDOMINAL PAIN: 0
VOMITING: 0
CONSTIPATION: 0
BACK PAIN: 0
SINUS PRESSURE: 0
COLOR CHANGE: 0
CHEST TIGHTNESS: 0
NAUSEA: 0
SHORTNESS OF BREATH: 0
DIARRHEA: 0
SINUS PAIN: 0

## 2023-05-11 ASSESSMENT — PATIENT HEALTH QUESTIONNAIRE - PHQ9
SUM OF ALL RESPONSES TO PHQ QUESTIONS 1-9: 0
2. FEELING DOWN, DEPRESSED OR HOPELESS: 0
1. LITTLE INTEREST OR PLEASURE IN DOING THINGS: 0
SUM OF ALL RESPONSES TO PHQ QUESTIONS 1-9: 0
SUM OF ALL RESPONSES TO PHQ QUESTIONS 1-9: 0
SUM OF ALL RESPONSES TO PHQ9 QUESTIONS 1 & 2: 0
SUM OF ALL RESPONSES TO PHQ QUESTIONS 1-9: 0

## 2023-05-11 NOTE — PATIENT INSTRUCTIONS
[facility] - Marco Yuan   [address], [place] [address] [address] Sylwia Castro   [phone]

## 2023-05-11 NOTE — PROGRESS NOTES
Natalie Martin (:  1985) is a 44 y.o. male,Established patient, here for evaluation of the following chief complaint(s):  6 Month Follow-Up and Health Maintenance (Depression screen completed. /SDOH completed. /)         ASSESSMENT/PLAN:  1. Anxiety  Comments:  Stable, ok to stop zoloft. 2. Gastroesophageal reflux disease without esophagitis  Comments:  Stable. Continue prilosec 20mg daily. Orders:  -     omeprazole (PRILOSEC) 20 MG delayed release capsule; take 1 capsule by mouth once daily, Disp-90 capsule, R-1Normal  3. Seasonal allergies  -     fexofenadine-pseudoephedrine (ALLEGRA-D 24HR) 180-240 MG per extended release tablet; Take 1 tablet by mouth daily, Disp-90 tablet, R-1Normal  4. Neck pain  Comments:  Outstanding neck XR, continue using prn zanaflex 4mg. 5. Acute non intractable tension-type headache  Comments:  Stable, continue prn fiorcet. Return in about 6 months (around 2023) for physical.         Subjective   SUBJECTIVE/OBJECTIVE:  Presents for 6 month follow up of chronic conditions    GI: Diagnosed with EPI  Was told to take creon with meals, if not eating do not take  Has only been taking at dinner time, reports stools have gotten back to normal. No diarrhea or gas/bloating. Taking prilosec daily   Needs to schedule annual visit     Migraine: well controlled  Using fiorcet prn, has not had Rx since 2022, still has 9 tablets left. Takes care of migraine right away if he does need it. Anxiety: Stopped zoloft on his own several months ago  Feels like symptoms are pretty well controlled     Neck pain: Waxes and wanes, has not yet completed neck xray   Muscle relaxer works well when he does have pain     Denies any other problems/concerns at this time       Review of Systems   Constitutional:  Negative for activity change, chills, fatigue and unexpected weight change. HENT:  Negative for hearing loss, postnasal drip, sinus pressure and sinus pain.     Eyes:

## 2023-06-01 ENCOUNTER — HOSPITAL ENCOUNTER (OUTPATIENT)
Age: 38
Discharge: HOME | End: 2023-06-03
Payer: COMMERCIAL

## 2023-06-01 ENCOUNTER — HOSPITAL ENCOUNTER (OUTPATIENT)
Dept: GENERAL RADIOLOGY | Age: 38
Discharge: HOME | End: 2023-06-03
Payer: COMMERCIAL

## 2023-06-01 DIAGNOSIS — N20.0 KIDNEY STONE ON RIGHT SIDE: ICD-10-CM

## 2023-06-01 DIAGNOSIS — G89.29 CHRONIC NECK PAIN: ICD-10-CM

## 2023-06-01 DIAGNOSIS — M54.2 CHRONIC NECK PAIN: ICD-10-CM

## 2023-06-01 PROCEDURE — 72040 X-RAY EXAM NECK SPINE 2-3 VW: CPT

## 2023-06-01 PROCEDURE — 74018 RADEX ABDOMEN 1 VIEW: CPT

## 2023-10-27 DIAGNOSIS — J30.2 SEASONAL ALLERGIES: ICD-10-CM

## 2023-10-27 RX ORDER — LORATADINE/PSEUDOEPHEDRINE 10MG-240MG
1 TABLET, EXTENDED RELEASE 24 HR ORAL DAILY
Qty: 180 TABLET | Refills: 1 | Status: SHIPPED | OUTPATIENT
Start: 2023-10-27

## 2023-10-27 NOTE — TELEPHONE ENCOUNTER
Serge Ball is calling to request a refill on the following medication(s):    Medication Request:  Requested Prescriptions     Pending Prescriptions Disp Refills    LORATADINE-D 24HR  MG per extended release tablet [Pharmacy Med Name: LORATADINE-D 24HR TABLET] 180 tablet      Sig: take 1 tablet by mouth once daily       Last Visit Date (If Applicable):  3/35/1207    Next Visit Date:    11/13/2023

## 2023-12-06 DIAGNOSIS — K21.9 GASTROESOPHAGEAL REFLUX DISEASE WITHOUT ESOPHAGITIS: ICD-10-CM

## 2023-12-06 RX ORDER — OMEPRAZOLE 20 MG/1
CAPSULE, DELAYED RELEASE ORAL
Qty: 90 CAPSULE | Refills: 1 | Status: SHIPPED | OUTPATIENT
Start: 2023-12-06

## 2023-12-06 NOTE — TELEPHONE ENCOUNTER
Last visit: 05/11/23  Last Med refill: 09/05/23  Does patient have enough medication for 72 hours: Yes    Next Visit Date:  Future Appointments   Date Time Provider [address]   1/8/2024  4:00 PM LILLIE Fitzgerald - NP [address] Maintenance   Topic Date Due    Hepatitis B vaccine (1 of 3 - 3-dose series) Never done    COVID-19 Vaccine (1) Never done    Hepatitis C screen  Never done    Flu vaccine (1) Never done    Depression Screen  05/11/2024    Diabetes screen  04/01/2025    DTaP/Tdap/Td vaccine (2 - Td or Tdap) 08/25/2031    Hepatitis A vaccine  Aged Out    Hib vaccine  Aged Out    HPV vaccine  Aged Out    Meningococcal (ACWY) vaccine  Aged Out    Pneumococcal 0-64 years Vaccine  Aged Out    Varicella vaccine  Discontinued    HIV screen  Discontinued       Hemoglobin A1C (%)   Date Value   04/01/2022 5.5             ( goal A1C is < 7)   No components found for: \"LABMICR\"  LDL Cholesterol (mg/dL)   Date Value   10/23/2021 111       (goal LDL is <100)   AST (U/L)   Date Value   10/23/2021 16     ALT (U/L)   Date Value   10/23/2021 32     BUN (mg/dL)   Date Value   10/23/2021 13     BP Readings from Last 3 Encounters:   05/11/23 118/78   11/10/22 118/80   06/29/22 136/85          (goal 120/80)    All Future Testing planned in [facility]  Lab Frequency Next Occurrence               Patient Active Problem List:     Chronic right shoulder pain     Allergic rhinitis     Anxiety     Gastroesophageal reflux disease without esophagitis     Loud snoring     Frequent nocturnal awakening     Dyspepsia     Diarrhea     Gastritis without bleeding

## 2024-03-22 ASSESSMENT — PATIENT HEALTH QUESTIONNAIRE - PHQ9
1. LITTLE INTEREST OR PLEASURE IN DOING THINGS: NOT AT ALL
SUM OF ALL RESPONSES TO PHQ9 QUESTIONS 1 & 2: 1
SUM OF ALL RESPONSES TO PHQ9 QUESTIONS 1 & 2: 1
1. LITTLE INTEREST OR PLEASURE IN DOING THINGS: NOT AT ALL
2. FEELING DOWN, DEPRESSED OR HOPELESS: SEVERAL DAYS
SUM OF ALL RESPONSES TO PHQ QUESTIONS 1-9: 1
2. FEELING DOWN, DEPRESSED OR HOPELESS: SEVERAL DAYS
SUM OF ALL RESPONSES TO PHQ QUESTIONS 1-9: 1

## 2024-03-25 ENCOUNTER — OFFICE VISIT (OUTPATIENT)
Dept: FAMILY MEDICINE CLINIC | Age: 39
End: 2024-03-25
Payer: COMMERCIAL

## 2024-03-25 VITALS
WEIGHT: 191.2 LBS | SYSTOLIC BLOOD PRESSURE: 122 MMHG | OXYGEN SATURATION: 98 % | RESPIRATION RATE: 18 BRPM | HEIGHT: 69 IN | BODY MASS INDEX: 28.32 KG/M2 | DIASTOLIC BLOOD PRESSURE: 80 MMHG | HEART RATE: 80 BPM

## 2024-03-25 DIAGNOSIS — Z13.220 SCREENING FOR HYPERLIPIDEMIA: ICD-10-CM

## 2024-03-25 DIAGNOSIS — Z13.0 SCREENING FOR DEFICIENCY ANEMIA: ICD-10-CM

## 2024-03-25 DIAGNOSIS — R73.01 ELEVATED FASTING GLUCOSE: ICD-10-CM

## 2024-03-25 DIAGNOSIS — R07.9 CHEST PAIN, UNSPECIFIED TYPE: Primary | ICD-10-CM

## 2024-03-25 DIAGNOSIS — G43.009 MIGRAINE WITHOUT AURA AND WITHOUT STATUS MIGRAINOSUS, NOT INTRACTABLE: ICD-10-CM

## 2024-03-25 DIAGNOSIS — J30.2 SEASONAL ALLERGIES: ICD-10-CM

## 2024-03-25 PROCEDURE — 99214 OFFICE O/P EST MOD 30 MIN: CPT | Performed by: NURSE PRACTITIONER

## 2024-03-25 RX ORDER — RIZATRIPTAN BENZOATE 10 MG/1
10 TABLET ORAL
Qty: 9 TABLET | Refills: 0 | Status: SHIPPED | OUTPATIENT
Start: 2024-03-25 | End: 2024-03-25

## 2024-03-25 NOTE — PROGRESS NOTES
Rogers Doyle (:  1985) is a 38 y.o. male,Established patient, here for evaluation of the following chief complaint(s):  Medication Check         ASSESSMENT/PLAN:  1. Chest pain, unspecified type  Comments:  Rx for 2d echo, stress test and EKG - f/u pending results.  Orders:  -     Echo (TTE) complete (PRN contrast/bubble/strain/3D); Future  -     Exercise stress test; Future  -     EKG 12 lead; Future  2. Elevated fasting glucose  -     Comprehensive Metabolic Panel; Future  -     Hemoglobin A1C; Future  3. Screening for hyperlipidemia  -     Lipid Panel; Future  4. Screening for deficiency anemia  -     CBC with Auto Differential; Future  5. Seasonal allergies  Comments:  Alternates daily claritin and allegra  6. Migraine without aura and without status migrainosus, not intractable  -     rizatriptan (MAXALT) 10 MG tablet; Take 1 tablet by mouth once as needed for Migraine May repeat in 2 hours if needed, Disp-9 tablet, R-0Normal  Rx for maxalt with instructions for use  Migraine diary as discussed to track symptoms, frequency and alleviating factors     Return in about 6 months (around 2024) for physical.         Subjective   SUBJECTIVE/OBJECTIVE:  Presents for f/u of chronic conditions     GI: Slowly tapered himself off of creon due to foul body odor  Reports no abdominal pain, unintended weight loss or episodes of diarrhea  Will go a full 1-3 days sometimes between BMs. Does not struggle with constipation.     Reports more frequent episodes of chest pain  Most recently while driving to [place] after drinking an energy drink around 3 weeks ago  Has fear of going to sleep and dying in his sleep, reports this was an intense childhood fear. Has felt these anxious moments coming back.   Unfamiliar with familial cardiac history     Denies any other problems/concerns at this time         Review of Systems   Constitutional:  Negative for activity change, chills, fatigue and unexpected weight

## 2024-03-26 ENCOUNTER — TELEPHONE (OUTPATIENT)
Dept: FAMILY MEDICINE CLINIC | Age: 39
End: 2024-03-26

## 2024-03-26 ASSESSMENT — ENCOUNTER SYMPTOMS
ABDOMINAL PAIN: 0
VOMITING: 0
CONSTIPATION: 0
BACK PAIN: 0
DIARRHEA: 0
SINUS PAIN: 0
COLOR CHANGE: 0
NAUSEA: 0
SINUS PRESSURE: 0
SHORTNESS OF BREATH: 0
CHEST TIGHTNESS: 0
EYE PAIN: 0

## 2024-03-26 NOTE — TELEPHONE ENCOUNTER
----- Message from LILLIE Brito NP sent at 3/26/2024  2:22 PM EDT -----  Please mail him a copy of 2d echo, EKG, stress test orders with scheduling information. Thank you!

## 2024-03-28 DIAGNOSIS — G89.29 CHRONIC NECK PAIN: Primary | ICD-10-CM

## 2024-03-28 DIAGNOSIS — M54.2 CHRONIC NECK PAIN: Primary | ICD-10-CM

## 2024-03-28 DIAGNOSIS — S16.1XXA STRAIN OF NECK MUSCLE, INITIAL ENCOUNTER: ICD-10-CM

## 2024-03-28 RX ORDER — CYCLOBENZAPRINE HCL 10 MG
10 TABLET ORAL 2 TIMES DAILY PRN
Qty: 20 TABLET | Refills: 0 | Status: SHIPPED | OUTPATIENT
Start: 2024-03-28 | End: 2024-04-07

## 2024-06-13 DIAGNOSIS — K21.9 GASTROESOPHAGEAL REFLUX DISEASE WITHOUT ESOPHAGITIS: ICD-10-CM

## 2024-06-13 RX ORDER — OMEPRAZOLE 20 MG/1
CAPSULE, DELAYED RELEASE ORAL
Qty: 90 CAPSULE | Refills: 1 | Status: SHIPPED | OUTPATIENT
Start: 2024-06-13

## 2024-06-13 NOTE — TELEPHONE ENCOUNTER
Last visit: 3/25/24  Last Med refill: 12/6/23  Does patient have enough medication for 72 hours: No:     Next Visit Date:  Future Appointments   Date Time Provider Department Center   9/26/2024  4:45 PM Luthy, Bijal, APRN - NP [facility]       Health Maintenance   Topic Date Due    Hepatitis B vaccine (1 of 3 - 3-dose series) Never done    COVID-19 Vaccine (1) Never done    Hepatitis C screen  Never done    Flu vaccine (Season Ended) 08/01/2024    Depression Screen  03/22/2025    Diabetes screen  04/01/2025    DTaP/Tdap/Td vaccine (2 - Td or Tdap) 08/25/2031    Hepatitis A vaccine  Aged Out    Hib vaccine  Aged Out    HPV vaccine  Aged Out    Polio vaccine  Aged Out    Meningococcal (ACWY) vaccine  Aged Out    Pneumococcal 0-64 years Vaccine  Aged Out    Varicella vaccine  Discontinued    HIV screen  Discontinued       Hemoglobin A1C (%)   Date Value   04/01/2022 5.5             ( goal A1C is < 7)   No components found for: \"LABMICR\"  No components found for: \"LDLCHOLESTEROL\", \"LDLCALC\"    (goal LDL is <100)   AST (U/L)   Date Value   10/23/2021 16     ALT (U/L)   Date Value   10/23/2021 32     BUN (mg/dL)   Date Value   10/23/2021 13     BP Readings from Last 3 Encounters:   03/25/24 122/80   05/11/23 118/78   11/10/22 118/80          (goal 120/80)    All Future Testing planned in [facility]  Lab Frequency Next Occurrence   CBC with Auto Differential Once 03/25/2024   Comprehensive Metabolic Panel Once 03/25/2024   Lipid Panel Once 03/25/2024   Hemoglobin A1C Once 03/25/2024   Echo (TTE) complete (PRN contrast/bubble/strain/3D) Once 03/26/2024   Exercise stress test Once 03/26/2024               Patient Active Problem List:     Chronic right shoulder pain     Allergic rhinitis     Anxiety     Gastroesophageal reflux disease without esophagitis     Loud snoring     Frequent nocturnal awakening     Dyspepsia     Diarrhea     Gastritis without bleeding

## 2024-10-17 ENCOUNTER — TELEPHONE (OUTPATIENT)
Dept: FAMILY MEDICINE CLINIC | Age: 39
End: 2024-10-17

## 2024-10-17 NOTE — TELEPHONE ENCOUNTER
That is not one we typically manage.  What dose is he taking and how often. I can send in 90 day supply, but unfortunately can not go longer than that. Needs to establish with GI for long term management of med.

## 2024-10-17 NOTE — TELEPHONE ENCOUNTER
Patient stated he no longer see's GI. His provider left and has not yet scheduled with a new provider. He stated he had some of the medication left so he just kept taking what he had

## 2024-10-18 ENCOUNTER — PATIENT MESSAGE (OUTPATIENT)
Dept: FAMILY MEDICINE CLINIC | Age: 39
End: 2024-10-18

## 2024-10-21 ENCOUNTER — OFFICE VISIT (OUTPATIENT)
Dept: FAMILY MEDICINE CLINIC | Age: 39
End: 2024-10-21
Payer: COMMERCIAL

## 2024-10-21 VITALS
WEIGHT: 194 LBS | RESPIRATION RATE: 18 BRPM | DIASTOLIC BLOOD PRESSURE: 78 MMHG | BODY MASS INDEX: 28.73 KG/M2 | HEART RATE: 91 BPM | OXYGEN SATURATION: 99 % | SYSTOLIC BLOOD PRESSURE: 122 MMHG | HEIGHT: 69 IN

## 2024-10-21 DIAGNOSIS — K86.89 PANCREATIC INSUFFICIENCY: Primary | ICD-10-CM

## 2024-10-21 DIAGNOSIS — B35.6 TINEA CRURIS: ICD-10-CM

## 2024-10-21 PROCEDURE — 99214 OFFICE O/P EST MOD 30 MIN: CPT | Performed by: NURSE PRACTITIONER

## 2024-10-21 RX ORDER — KETOCONAZOLE 20 MG/G
1 CREAM TOPICAL 2 TIMES DAILY
Qty: 60 G | Refills: 3 | Status: SHIPPED | OUTPATIENT
Start: 2024-10-21

## 2024-10-21 RX ORDER — PANCRELIPASE 36000; 180000; 114000 [USP'U]/1; [USP'U]/1; [USP'U]/1
1 CAPSULE, DELAYED RELEASE PELLETS ORAL 2 TIMES DAILY
Qty: 60 CAPSULE | Refills: 1 | Status: SHIPPED | OUTPATIENT
Start: 2024-10-21 | End: 2024-10-25 | Stop reason: SDUPTHER

## 2024-10-21 RX ORDER — FLUCONAZOLE 150 MG/1
150 TABLET ORAL
Qty: 2 TABLET | Refills: 0 | Status: SHIPPED | OUTPATIENT
Start: 2024-10-21 | End: 2024-10-27

## 2024-10-21 SDOH — ECONOMIC STABILITY: FOOD INSECURITY: WITHIN THE PAST 12 MONTHS, YOU WORRIED THAT YOUR FOOD WOULD RUN OUT BEFORE YOU GOT MONEY TO BUY MORE.: NEVER TRUE

## 2024-10-21 SDOH — ECONOMIC STABILITY: FOOD INSECURITY: WITHIN THE PAST 12 MONTHS, THE FOOD YOU BOUGHT JUST DIDN'T LAST AND YOU DIDN'T HAVE MONEY TO GET MORE.: NEVER TRUE

## 2024-10-21 SDOH — ECONOMIC STABILITY: INCOME INSECURITY: HOW HARD IS IT FOR YOU TO PAY FOR THE VERY BASICS LIKE FOOD, HOUSING, MEDICAL CARE, AND HEATING?: NOT HARD AT ALL

## 2024-10-21 NOTE — PROGRESS NOTES
Rogers Doyle (:  1985) is a 38 y.o. male,Established patient, here for evaluation of the following chief complaint(s):  6 Month Follow-Up (Discuss medication and athletes foot. /)       Assessment & Plan  Pancreatic insufficiency    Rx for creon as indicated . Referral to GI to establish care for further management of chronic condition.     Orders:    Tuan Almeida MD, Gastroenterology, [place]    lipase-protease-amylase (CREON) 95197-170652 units CPEP delayed release capsule; Take 1 capsule by mouth in the morning and at bedtime    Tinea cruris    Rx for ketoconazole cream with diflucan tabs as indicated. Keep skin clean, dry. Avoid re using towels to affected area.     Orders:    fluconazole (DIFLUCAN) 150 MG tablet; Take 1 tablet by mouth every 72 hours for 6 days    ketoconazole (NIZORAL) 2 % cream; Apply 1 Applicatorful topically 2 times daily Apply topically BID for 2-4 weeks      Return in about 6 months (around 2025) for physical.       Subjective   Presents for 6 month follow up of chronic conditions     Requesting to restart creon until he can get back in with GI  Reports BMs have not been regular - more loose, than formed, some abdominal pain and bloating  Denies hematochezia, unintended weight loss, nausea, vomiting    Has struggled with athletes foot for years - some months better than others  Uses otc creams, sprays - works for him most of the time  Currently feet seem to be ok     Reports reddened macular rash to bilateral groin for several weeks  It is not itchy, but skin can get flaky  Has not tried anything for this                Review of Systems   Constitutional:  Negative for activity change, chills, fatigue and unexpected weight change.   HENT:  Negative for hearing loss, postnasal drip, sinus pressure and sinus pain.    Eyes:  Negative for pain and visual disturbance.   Respiratory:  Negative for chest tightness and shortness of breath.    Cardiovascular:

## 2024-10-24 ASSESSMENT — ENCOUNTER SYMPTOMS
ABDOMINAL PAIN: 1
SINUS PRESSURE: 0
CONSTIPATION: 0
EYE PAIN: 0
VOMITING: 0
ABDOMINAL DISTENTION: 1
COLOR CHANGE: 0
CHEST TIGHTNESS: 0
BACK PAIN: 0
DIARRHEA: 0
SINUS PAIN: 0
SHORTNESS OF BREATH: 0
NAUSEA: 0

## 2024-10-25 DIAGNOSIS — K86.89 PANCREATIC INSUFFICIENCY: ICD-10-CM

## 2024-10-25 RX ORDER — PANCRELIPASE 36000; 180000; 114000 [USP'U]/1; [USP'U]/1; [USP'U]/1
1 CAPSULE, DELAYED RELEASE PELLETS ORAL 2 TIMES DAILY
Qty: 100 CAPSULE | Refills: 0 | Status: SHIPPED | OUTPATIENT
Start: 2024-10-25

## 2024-10-25 NOTE — TELEPHONE ENCOUNTER
Pt stopped in office in regards to Creon. [facility] pharmacy will not fill script because they only order it for him. It come in a quantity on 100. Can you resend script fro #100 with no refills?

## 2024-10-25 NOTE — TELEPHONE ENCOUNTER
Patient stopped in office in regards to creon needing to be qty of 100 due to stocking issue with [facility]

## 2024-11-07 ENCOUNTER — TELEPHONE (OUTPATIENT)
Dept: GASTROENTEROLOGY | Age: 39
End: 2024-11-07

## 2024-11-07 ENCOUNTER — PREP FOR PROCEDURE (OUTPATIENT)
Dept: GASTROENTEROLOGY | Age: 39
End: 2024-11-07

## 2024-11-07 ENCOUNTER — OFFICE VISIT (OUTPATIENT)
Dept: GASTROENTEROLOGY | Age: 39
End: 2024-11-07
Payer: COMMERCIAL

## 2024-11-07 VITALS
RESPIRATION RATE: 18 BRPM | TEMPERATURE: 98.2 F | SYSTOLIC BLOOD PRESSURE: 141 MMHG | DIASTOLIC BLOOD PRESSURE: 88 MMHG | HEART RATE: 76 BPM | OXYGEN SATURATION: 96 % | BODY MASS INDEX: 29.67 KG/M2 | WEIGHT: 201 LBS

## 2024-11-07 DIAGNOSIS — R10.32 ABDOMINAL PAIN, LEFT LOWER QUADRANT: ICD-10-CM

## 2024-11-07 DIAGNOSIS — K86.89 PANCREATIC INSUFFICIENCY: Primary | ICD-10-CM

## 2024-11-07 DIAGNOSIS — R19.7 DIARRHEA, UNSPECIFIED TYPE: ICD-10-CM

## 2024-11-07 PROCEDURE — 99214 OFFICE O/P EST MOD 30 MIN: CPT | Performed by: INTERNAL MEDICINE

## 2024-11-07 ASSESSMENT — ENCOUNTER SYMPTOMS
BLOOD IN STOOL: 0
NAUSEA: 0
WHEEZING: 0
CONSTIPATION: 1
CHOKING: 0
VOMITING: 0
RECTAL PAIN: 0
TROUBLE SWALLOWING: 0
ABDOMINAL PAIN: 1
VOICE CHANGE: 0
ABDOMINAL DISTENTION: 1
DIARRHEA: 1
COUGH: 0
ANAL BLEEDING: 0
SORE THROAT: 0

## 2024-11-07 NOTE — TELEPHONE ENCOUNTER
Procedure scheduled/Dr Dahl  Procedure:Colonoscopy   Dx:Diarrhea, unspecified type; Abdominal pain, left lower quadrant   Date:02/27/2025  Time:8:15 am / Arrive: 6:15 am  Hospital:[facility] phone call:alex  Bowel Prep instructions given:miralax  In office/via phone:office   Clearance needed:none

## 2024-11-07 NOTE — PROGRESS NOTES
actually reflects the content of the visit, it can still have some errors including those of syntax and sound-a-like substitutions which may escape proof reading. Actual meaning can be extrapolated by contextual inference.    Vladimir Dahl MD  [facility]  O: #[phone]

## 2024-11-11 ENCOUNTER — PATIENT MESSAGE (OUTPATIENT)
Dept: FAMILY MEDICINE CLINIC | Age: 39
End: 2024-11-11

## 2024-11-11 DIAGNOSIS — G89.29 CHRONIC NECK PAIN: ICD-10-CM

## 2024-11-11 DIAGNOSIS — M54.2 CHRONIC NECK PAIN: ICD-10-CM

## 2024-11-11 RX ORDER — BACLOFEN 10 MG/1
10 TABLET ORAL 2 TIMES DAILY
Qty: 20 TABLET | Refills: 0 | Status: SHIPPED | OUTPATIENT
Start: 2024-11-11 | End: 2024-11-21

## 2024-11-27 ENCOUNTER — HOSPITAL ENCOUNTER (OUTPATIENT)
Age: 39
Setting detail: SPECIMEN
Discharge: HOME | End: 2024-11-27

## 2024-11-27 DIAGNOSIS — Z13.220 SCREENING FOR HYPERLIPIDEMIA: ICD-10-CM

## 2024-11-27 DIAGNOSIS — Z13.0 SCREENING FOR DEFICIENCY ANEMIA: ICD-10-CM

## 2024-11-27 DIAGNOSIS — R73.01 ELEVATED FASTING GLUCOSE: ICD-10-CM

## 2024-11-27 DIAGNOSIS — R10.32 ABDOMINAL PAIN, LEFT LOWER QUADRANT: ICD-10-CM

## 2024-11-27 DIAGNOSIS — K86.89 PANCREATIC INSUFFICIENCY: ICD-10-CM

## 2024-11-27 LAB
ALBUMIN SERPL-MCNC: 4.6 G/DL (ref 3.5–5.2)
ALBUMIN SERPL-MCNC: 4.6 G/DL (ref 3.5–5.2)
ALBUMIN/GLOB SERPL: 1.8 {RATIO} (ref 1–2.5)
ALBUMIN/GLOB SERPL: 1.9 {RATIO} (ref 1–2.5)
ALP SERPL-CCNC: 81 U/L (ref 40–129)
ALP SERPL-CCNC: 82 U/L (ref 40–129)
ALT SERPL-CCNC: 41 U/L (ref 10–50)
ALT SERPL-CCNC: 42 U/L (ref 10–50)
ANION GAP SERPL CALCULATED.3IONS-SCNC: 10 MMOL/L (ref 9–16)
ANION GAP SERPL CALCULATED.3IONS-SCNC: 12 MMOL/L (ref 9–16)
AST SERPL-CCNC: 27 U/L (ref 10–50)
AST SERPL-CCNC: 27 U/L (ref 10–50)
BASOPHILS # BLD: 0.04 K/UL (ref 0–0.2)
BASOPHILS NFR BLD: 1 % (ref 0–2)
BILIRUB DIRECT SERPL-MCNC: 0.3 MG/DL (ref 0–0.2)
BILIRUB INDIRECT SERPL-MCNC: 0.7 MG/DL (ref 0–1)
BILIRUB SERPL-MCNC: 0.9 MG/DL (ref 0–1.2)
BILIRUB SERPL-MCNC: 1 MG/DL (ref 0–1.2)
BUN SERPL-MCNC: 13 MG/DL (ref 6–20)
BUN SERPL-MCNC: 13 MG/DL (ref 6–20)
CALCIUM SERPL-MCNC: 9.3 MG/DL (ref 8.6–10.4)
CALCIUM SERPL-MCNC: 9.4 MG/DL (ref 8.6–10.4)
CHLORIDE SERPL-SCNC: 102 MMOL/L (ref 98–107)
CHLORIDE SERPL-SCNC: 104 MMOL/L (ref 98–107)
CHOLEST SERPL-MCNC: 186 MG/DL (ref 0–199)
CHOLESTEROL/HDL RATIO: 4.8
CO2 SERPL-SCNC: 25 MMOL/L (ref 20–31)
CO2 SERPL-SCNC: 27 MMOL/L (ref 20–31)
CREAT SERPL-MCNC: 1.1 MG/DL (ref 0.7–1.2)
CREAT SERPL-MCNC: 1.1 MG/DL (ref 0.7–1.2)
EOSINOPHIL # BLD: 0.34 K/UL (ref 0–0.44)
EOSINOPHILS RELATIVE PERCENT: 4 % (ref 1–4)
ERYTHROCYTE [DISTWIDTH] IN BLOOD BY AUTOMATED COUNT: 12.2 % (ref 11.8–14.4)
EST. AVERAGE GLUCOSE BLD GHB EST-MCNC: 100 MG/DL
GFR, ESTIMATED: 88 ML/MIN/1.73M2
GFR, ESTIMATED: 88 ML/MIN/1.73M2
GLUCOSE SERPL-MCNC: 95 MG/DL (ref 74–99)
GLUCOSE SERPL-MCNC: 96 MG/DL (ref 74–99)
HBA1C MFR BLD: 5.1 % (ref 4–6)
HCT VFR BLD AUTO: 46.5 % (ref 40.7–50.3)
HDLC SERPL-MCNC: 39 MG/DL
HGB BLD-MCNC: 15.4 G/DL (ref 13–17)
IMM GRANULOCYTES # BLD AUTO: <0.03 K/UL (ref 0–0.3)
IMM GRANULOCYTES NFR BLD: 0 %
LDLC SERPL CALC-MCNC: 131 MG/DL (ref 0–100)
LYMPHOCYTES NFR BLD: 2.85 K/UL (ref 1.1–3.7)
LYMPHOCYTES RELATIVE PERCENT: 35 % (ref 24–43)
MCH RBC QN AUTO: 29.7 PG (ref 25.2–33.5)
MCHC RBC AUTO-ENTMCNC: 33.1 G/DL (ref 28.4–34.8)
MCV RBC AUTO: 89.6 FL (ref 82.6–102.9)
MONOCYTES NFR BLD: 0.67 K/UL (ref 0.1–1.2)
MONOCYTES NFR BLD: 8 % (ref 3–12)
NEUTROPHILS NFR BLD: 52 % (ref 36–65)
NEUTS SEG NFR BLD: 4.14 K/UL (ref 1.5–8.1)
NRBC BLD-RTO: 0 PER 100 WBC
PLATELET # BLD AUTO: 281 K/UL (ref 138–453)
PMV BLD AUTO: 11.1 FL (ref 8.1–13.5)
POTASSIUM SERPL-SCNC: 3.8 MMOL/L (ref 3.7–5.3)
POTASSIUM SERPL-SCNC: 4 MMOL/L (ref 3.7–5.3)
PROT SERPL-MCNC: 7 G/DL (ref 6.6–8.7)
PROT SERPL-MCNC: 7.1 G/DL (ref 6.6–8.7)
RBC # BLD AUTO: 5.19 M/UL (ref 4.21–5.77)
SODIUM SERPL-SCNC: 139 MMOL/L (ref 136–145)
SODIUM SERPL-SCNC: 141 MMOL/L (ref 136–145)
TRIGL SERPL-MCNC: 77 MG/DL
TRIGL SERPL-MCNC: 80 MG/DL
VLDLC SERPL CALC-MCNC: 16 MG/DL (ref 1–30)
WBC OTHER # BLD: 8.1 K/UL (ref 3.5–11.3)

## 2024-11-28 LAB
GLIADIN IGA SER IA-ACNC: 2.4 U/ML
GLIADIN IGG SER IA-ACNC: <0.4 U/ML
IGA SERPL-MCNC: 199 MG/DL (ref 70–400)
TTG IGA SER IA-ACNC: 0.4 U/ML

## 2024-12-02 ENCOUNTER — PATIENT MESSAGE (OUTPATIENT)
Dept: FAMILY MEDICINE CLINIC | Age: 39
End: 2024-12-02

## 2024-12-09 ENCOUNTER — OFFICE VISIT (OUTPATIENT)
Dept: FAMILY MEDICINE CLINIC | Age: 39
End: 2024-12-09
Payer: COMMERCIAL

## 2024-12-09 VITALS
OXYGEN SATURATION: 100 % | BODY MASS INDEX: 30.18 KG/M2 | HEART RATE: 78 BPM | DIASTOLIC BLOOD PRESSURE: 78 MMHG | RESPIRATION RATE: 20 BRPM | SYSTOLIC BLOOD PRESSURE: 124 MMHG | WEIGHT: 203.8 LBS | HEIGHT: 69 IN

## 2024-12-09 DIAGNOSIS — M25.512 ACUTE PAIN OF LEFT SHOULDER: Primary | ICD-10-CM

## 2024-12-09 DIAGNOSIS — M54.9 MID BACK PAIN ON RIGHT SIDE: ICD-10-CM

## 2024-12-09 PROCEDURE — 99213 OFFICE O/P EST LOW 20 MIN: CPT | Performed by: NURSE PRACTITIONER

## 2024-12-09 ASSESSMENT — ENCOUNTER SYMPTOMS
CONSTIPATION: 0
NAUSEA: 0
BACK PAIN: 1
EYE PAIN: 0
COLOR CHANGE: 0
CHEST TIGHTNESS: 0
SHORTNESS OF BREATH: 0
SINUS PRESSURE: 0
SINUS PAIN: 0
ABDOMINAL PAIN: 0
DIARRHEA: 0
VOMITING: 0

## 2024-12-09 NOTE — PROGRESS NOTES
Rogers Doyle (:  1985) is a 39 y.o. male,Established patient, here for evaluation of the following chief complaint(s):  Back Pain (Body pain. Started in his neck-down in his shoulder-left arm. Now shoots to his back and down left leg. )         Assessment & Plan  Acute pain of left shoulder    Acute condition. Continue gentle stretches, prn otc analgesics. Can consider xray if no improvement in symptoms in the next 14 days.          Mid back pain on right side    Physical exam most consistent with musculoskeletal injury. Discussed this can take up to 6 weeks to fully resolve. Continue gentle stretching. Prn muscle relaxer and otc analgesics. If painful symptoms do not improve or suddenly worsen in the next 14 days can consider xray.           On this date 2024 I have spent 20 minutes reviewing previous notes, test results and face to face with the patient discussing the diagnosis and importance of compliance with the treatment plan as well as documenting on the day of the visit.      Return if symptoms worsen or fail to improve.       Subjective   Presents for acute visit    Chronic history of neck pain. Was going on cruise and went to massage therapist prior to vacation. Took muscle relaxer the next day due to pain. 48 hours later neck pain went completely away.  Got back from cruise 2024 and noticed left shoulder pain with left arm numbness, especially with ROM. Got anxious thinking he was having a stroke. Left shoulder pain eventually spread from neck down to right side, middle back. Woke up this morning with pain 8/10, took ibuprofen and that knocked pain down to 5/10. Muscle relaxer completely resolves middle back pain. Back pain aggravated with turning to the left, otherwise it does not bother him.   Admits left shoulder is also relatively pain free, does not bother him unless he is continuously doing ROM with it. Endorses some cracking, popping noises in shoulder with ROM.

## 2024-12-23 ENCOUNTER — PATIENT MESSAGE (OUTPATIENT)
Dept: FAMILY MEDICINE CLINIC | Age: 39
End: 2024-12-23

## 2024-12-23 DIAGNOSIS — M25.512 ACUTE PAIN OF LEFT SHOULDER: ICD-10-CM

## 2024-12-23 DIAGNOSIS — G89.29 CHRONIC NECK PAIN: Primary | ICD-10-CM

## 2024-12-23 DIAGNOSIS — M54.2 CHRONIC NECK PAIN: Primary | ICD-10-CM

## 2024-12-27 ENCOUNTER — HOSPITAL ENCOUNTER (OUTPATIENT)
Age: 39
Discharge: HOME | End: 2024-12-29
Payer: COMMERCIAL

## 2024-12-27 ENCOUNTER — HOSPITAL ENCOUNTER (OUTPATIENT)
Dept: GENERAL RADIOLOGY | Age: 39
Discharge: HOME | End: 2024-12-29
Payer: COMMERCIAL

## 2024-12-27 DIAGNOSIS — G89.29 CHRONIC NECK PAIN: ICD-10-CM

## 2024-12-27 DIAGNOSIS — M54.2 CHRONIC NECK PAIN: ICD-10-CM

## 2024-12-27 DIAGNOSIS — M25.512 ACUTE PAIN OF LEFT SHOULDER: ICD-10-CM

## 2024-12-27 PROCEDURE — 72040 X-RAY EXAM NECK SPINE 2-3 VW: CPT

## 2024-12-27 PROCEDURE — 73030 X-RAY EXAM OF SHOULDER: CPT

## 2025-01-07 ENCOUNTER — HOSPITAL ENCOUNTER (OUTPATIENT)
Age: 40
Setting detail: SPECIMEN
Discharge: HOME | End: 2025-01-07

## 2025-01-07 DIAGNOSIS — K86.89 PANCREATIC INSUFFICIENCY: ICD-10-CM

## 2025-01-09 LAB
FAT QUALITATIVE SPLIT STOOL: NORMAL
FECAL NEUTRAL FAT: NORMAL
FECAL PANCREATIC ELASTASE-1: >800 UG/G

## 2025-01-13 DIAGNOSIS — M54.2 CHRONIC NECK PAIN: Primary | ICD-10-CM

## 2025-01-13 DIAGNOSIS — M54.9 MID BACK PAIN ON RIGHT SIDE: ICD-10-CM

## 2025-01-13 DIAGNOSIS — M25.512 ACUTE PAIN OF LEFT SHOULDER: ICD-10-CM

## 2025-01-13 DIAGNOSIS — G89.29 CHRONIC NECK PAIN: Primary | ICD-10-CM

## 2025-01-27 DIAGNOSIS — G43.009 MIGRAINE WITHOUT AURA AND WITHOUT STATUS MIGRAINOSUS, NOT INTRACTABLE: Primary | ICD-10-CM

## 2025-01-27 RX ORDER — BUTALBITAL, ACETAMINOPHEN AND CAFFEINE 50; 325; 40 MG/1; MG/1; MG/1
TABLET ORAL
Qty: 90 TABLET | Refills: 0 | Status: SHIPPED | OUTPATIENT
Start: 2025-01-27

## 2025-01-27 NOTE — TELEPHONE ENCOUNTER
Patient contacted office asking for refill on:  -Fioricet     [facility] [address]      Last visit: 12/9/24  Last Med refill: 6/2/2023  Does patient have enough medication for 72 hours: No: only 2 pills left    Next Visit Date:  Future Appointments   Date Time Provider Department Center   2/13/2025  8:00 AM [facility] PAT RM 4 [facility]   2/19/2025  3:45 PM Bonilla Hernandez,  [facility] [place]   3/5/2025  4:00 PM Vladimir Dahl MD [facility]   4/21/2025  3:00 PM Bijal Crawford APRN - NP [facility]       Health Maintenance   Topic Date Due    Hepatitis C screen  Never done    Hepatitis B vaccine (1 of 3 - 19+ 3-dose series) Never done    Flu vaccine (1) Never done    COVID-19 Vaccine (1 - 2023-24 season) Never done    Depression Screen  03/22/2025    Lipids  11/27/2029    DTaP/Tdap/Td vaccine (2 - Td or Tdap) 08/25/2031    Hepatitis A vaccine  Aged Out    Hib vaccine  Aged Out    HPV vaccine  Aged Out    Polio vaccine  Aged Out    Meningococcal (ACWY) vaccine  Aged Out    Pneumococcal 0-64 years Vaccine  Aged Out    Varicella vaccine  Discontinued    Diabetes screen  Discontinued    HIV screen  Discontinued       Hemoglobin A1C (%)   Date Value   11/27/2024 5.1   04/01/2022 5.5             ( goal A1C is < 7)   No components found for: \"LABMICR\"  No components found for: \"LDLCHOLESTEROL\", \"LDLCALC\"    (goal LDL is <100)   AST (U/L)   Date Value   11/27/2024 27   11/27/2024 27     ALT (U/L)   Date Value   11/27/2024 42   11/27/2024 41     BUN (mg/dL)   Date Value   11/27/2024 13   11/27/2024 13     BP Readings from Last 3 Encounters:   12/09/24 124/78   11/07/24 (!) 141/88   10/21/24 122/78          (goal 120/80)    All Future Testing planned in [facility]  Lab Frequency Next Occurrence   Echo (TTE) complete (PRN contrast/bubble/strain/3D) Once 03/26/2024   Exercise stress test Once 03/26/2024   CT ABDOMEN PELVIS W IV CONTRAST Once 11/07/2024   COLONOSCOPY W/ OR W/O BIOPSY Once 11/07/2024

## 2025-01-28 ENCOUNTER — PATIENT MESSAGE (OUTPATIENT)
Dept: FAMILY MEDICINE CLINIC | Age: 40
End: 2025-01-28

## 2025-01-28 DIAGNOSIS — M54.2 CHRONIC NECK PAIN: ICD-10-CM

## 2025-01-28 DIAGNOSIS — G89.29 CHRONIC NECK PAIN: ICD-10-CM

## 2025-01-28 SDOH — ECONOMIC STABILITY: FOOD INSECURITY: WITHIN THE PAST 12 MONTHS, THE FOOD YOU BOUGHT JUST DIDN'T LAST AND YOU DIDN'T HAVE MONEY TO GET MORE.: NEVER TRUE

## 2025-01-28 SDOH — ECONOMIC STABILITY: FOOD INSECURITY: WITHIN THE PAST 12 MONTHS, YOU WORRIED THAT YOUR FOOD WOULD RUN OUT BEFORE YOU GOT MONEY TO BUY MORE.: NEVER TRUE

## 2025-01-28 SDOH — ECONOMIC STABILITY: INCOME INSECURITY: IN THE LAST 12 MONTHS, WAS THERE A TIME WHEN YOU WERE NOT ABLE TO PAY THE MORTGAGE OR RENT ON TIME?: NO

## 2025-01-28 SDOH — ECONOMIC STABILITY: TRANSPORTATION INSECURITY
IN THE PAST 12 MONTHS, HAS THE LACK OF TRANSPORTATION KEPT YOU FROM MEDICAL APPOINTMENTS OR FROM GETTING MEDICATIONS?: NO

## 2025-01-28 SDOH — ECONOMIC STABILITY: TRANSPORTATION INSECURITY
IN THE PAST 12 MONTHS, HAS LACK OF TRANSPORTATION KEPT YOU FROM MEETINGS, WORK, OR FROM GETTING THINGS NEEDED FOR DAILY LIVING?: NO

## 2025-01-28 ASSESSMENT — PATIENT HEALTH QUESTIONNAIRE - PHQ9
SUM OF ALL RESPONSES TO PHQ QUESTIONS 1-9: 0
2. FEELING DOWN, DEPRESSED OR HOPELESS: NOT AT ALL
SUM OF ALL RESPONSES TO PHQ QUESTIONS 1-9: 0
SUM OF ALL RESPONSES TO PHQ9 QUESTIONS 1 & 2: 0
SUM OF ALL RESPONSES TO PHQ9 QUESTIONS 1 & 2: 0
1. LITTLE INTEREST OR PLEASURE IN DOING THINGS: NOT AT ALL
1. LITTLE INTEREST OR PLEASURE IN DOING THINGS: NOT AT ALL
2. FEELING DOWN, DEPRESSED OR HOPELESS: NOT AT ALL
SUM OF ALL RESPONSES TO PHQ QUESTIONS 1-9: 0
SUM OF ALL RESPONSES TO PHQ QUESTIONS 1-9: 0

## 2025-01-29 ENCOUNTER — OFFICE VISIT (OUTPATIENT)
Dept: FAMILY MEDICINE CLINIC | Age: 40
End: 2025-01-29
Payer: COMMERCIAL

## 2025-01-29 VITALS
RESPIRATION RATE: 20 BRPM | WEIGHT: 204 LBS | HEART RATE: 74 BPM | SYSTOLIC BLOOD PRESSURE: 124 MMHG | BODY MASS INDEX: 30.21 KG/M2 | DIASTOLIC BLOOD PRESSURE: 74 MMHG | HEIGHT: 69 IN | OXYGEN SATURATION: 96 %

## 2025-01-29 DIAGNOSIS — G89.29 CHRONIC NECK PAIN: ICD-10-CM

## 2025-01-29 DIAGNOSIS — M54.2 CHRONIC NECK PAIN: ICD-10-CM

## 2025-01-29 DIAGNOSIS — G43.109 MIGRAINE WITH AURA AND WITHOUT STATUS MIGRAINOSUS, NOT INTRACTABLE: Primary | ICD-10-CM

## 2025-01-29 PROCEDURE — 99214 OFFICE O/P EST MOD 30 MIN: CPT | Performed by: NURSE PRACTITIONER

## 2025-01-29 RX ORDER — BACLOFEN 10 MG/1
10 TABLET ORAL 2 TIMES DAILY
Qty: 20 TABLET | Refills: 1 | Status: SHIPPED | OUTPATIENT
Start: 2025-01-29 | End: 2025-02-18

## 2025-01-29 RX ORDER — BACLOFEN 10 MG/1
10 TABLET ORAL 2 TIMES DAILY
Qty: 20 TABLET | Refills: 0 | OUTPATIENT
Start: 2025-01-29 | End: 2025-02-08

## 2025-01-29 ASSESSMENT — ENCOUNTER SYMPTOMS
VOMITING: 0
EYE REDNESS: 0
EYE DISCHARGE: 0
EYE ITCHING: 0
PHOTOPHOBIA: 1
NAUSEA: 0
EYE PAIN: 0

## 2025-01-29 NOTE — PROGRESS NOTES
Rogers Doyle (:  1985) is a 39 y.o. male,Established patient, here for evaluation of the following chief complaint(s):  Migraine (Having vision issues that are causing a migraine. /Does not have an eye doctor. /Pt states a few days ago he was driving and it got super bright and he had kind of like tunnel vision. /)         Assessment & Plan  Migraine with aura and without status migrainosus, not intractable  CT head today due to new onset of visual aura with associated migraines - follow up pending results.  Advised him to update eye exam  Declined additional medication management for recurrent migraines, would like to stick with prn Fioricet for now. Referral to Neurology if no improvement.     Orders:    CT HEAD WO CONTRAST; Future    Chronic neck pain     Orders:    baclofen (LIORESAL) 10 MG tablet; Take 1 tablet by mouth 2 times daily for 20 days      Return if symptoms worsen or fail to improve.       Subjective   Presents for acute visit to discuss vision changes and migraine    Last Thursday (6 days ago) was looking at a car for his daughter  Was driving and noticed things getting bright, put sunglasses on and did not notice a difference. Felt like peripheral vision went black and was unable to even see his left hand. Got home and felt like he had shiny lights to lower portion of vision. Thought maybe migraine was setting in because he developed typical pain behind his eyes and forehead. He did take dose of fiorcet at that time. Went to bed at 6pm and did not wake up until 6am. Woke up the next morning and felt a headache - \"pressure\" behind eye brows and behind his eyes, but vision was normal. By Saturday he was headache free, no vision abnormalities.    Yesterday went to [facility], sat in parking lot for 30 mins. Got out of car and felt dizzy, once he walked inside vision started to get bright again. Felt headache coming on. Picked up a box and noticed he could only focus on one word at a

## 2025-02-07 ENCOUNTER — HOSPITAL ENCOUNTER (OUTPATIENT)
Dept: CT IMAGING | Age: 40
Discharge: HOME | End: 2025-02-09
Payer: COMMERCIAL

## 2025-02-07 DIAGNOSIS — G43.109 MIGRAINE WITH AURA AND WITHOUT STATUS MIGRAINOSUS, NOT INTRACTABLE: ICD-10-CM

## 2025-02-07 PROCEDURE — 70450 CT HEAD/BRAIN W/O DYE: CPT

## 2025-02-13 DIAGNOSIS — G43.109 MIGRAINE WITH AURA AND WITHOUT STATUS MIGRAINOSUS, NOT INTRACTABLE: ICD-10-CM

## 2025-02-13 DIAGNOSIS — J34.9 PARANASAL SINUS DISEASE: Primary | ICD-10-CM

## 2025-04-03 ENCOUNTER — OFFICE VISIT (OUTPATIENT)
Dept: FAMILY MEDICINE CLINIC | Age: 40
End: 2025-04-03
Payer: COMMERCIAL

## 2025-04-03 VITALS
WEIGHT: 209.4 LBS | DIASTOLIC BLOOD PRESSURE: 82 MMHG | OXYGEN SATURATION: 95 % | BODY MASS INDEX: 30.91 KG/M2 | HEART RATE: 73 BPM | TEMPERATURE: 98.3 F | SYSTOLIC BLOOD PRESSURE: 118 MMHG

## 2025-04-03 DIAGNOSIS — J30.1 SEASONAL ALLERGIC RHINITIS DUE TO POLLEN: Primary | ICD-10-CM

## 2025-04-03 DIAGNOSIS — B96.89 ACUTE BACTERIAL SINUSITIS: ICD-10-CM

## 2025-04-03 DIAGNOSIS — J01.90 ACUTE BACTERIAL SINUSITIS: ICD-10-CM

## 2025-04-03 PROCEDURE — 99214 OFFICE O/P EST MOD 30 MIN: CPT | Performed by: INTERNAL MEDICINE

## 2025-04-03 RX ORDER — LORATADINE 10 MG/1
10 TABLET ORAL DAILY
Qty: 30 TABLET | Refills: 3 | Status: SHIPPED | OUTPATIENT
Start: 2025-04-03 | End: 2025-04-03 | Stop reason: ALTCHOICE

## 2025-04-03 RX ORDER — FLUTICASONE PROPIONATE 50 MCG
1 SPRAY, SUSPENSION (ML) NASAL DAILY
Qty: 16 G | Refills: 2 | Status: SHIPPED | OUTPATIENT
Start: 2025-04-03

## 2025-04-03 RX ORDER — AMOXICILLIN 875 MG/1
875 TABLET, COATED ORAL 2 TIMES DAILY
Qty: 14 TABLET | Refills: 0 | Status: SHIPPED | OUTPATIENT
Start: 2025-04-03 | End: 2025-04-10

## 2025-04-03 ASSESSMENT — ENCOUNTER SYMPTOMS
RHINORRHEA: 1
SINUS PRESSURE: 1
SINUS PAIN: 1
ANAL BLEEDING: 0
COUGH: 0
VOMITING: 0
CONSTIPATION: 0
SWOLLEN GLANDS: 0
SORE THROAT: 0
BLOOD IN STOOL: 0
HOARSE VOICE: 0
WHEEZING: 0
SHORTNESS OF BREATH: 0
DIARRHEA: 0
SINUS COMPLAINT: 1
ABDOMINAL PAIN: 0
NAUSEA: 0
CHOKING: 0
CHEST TIGHTNESS: 0

## 2025-04-03 ASSESSMENT — VISUAL ACUITY: OU: 1

## 2025-04-03 NOTE — PROGRESS NOTES
[facility]  [facility]  [address] [address]  Dept: [phone]  Dept Fax: [phone]      Rogers Doyle is a 39 y.o. male who presents today for hismedical conditions/complaints as noted below.  Rogers Doyle is c/o of Sinus Problem (Started about a month and a half ago, had vomiting and diarrhea, got better then started with a head cold, started about 2 days ago, gums hurt, eras are popping, greenish mucus, no cough, no fever, runny nose )        Assessment/Plan:     1. Seasonal allergic rhinitis due to pollen  -     loratadine (CLARITIN) 10 MG tablet; Take 1 tablet by mouth daily, Disp-30 tablet, R-3Normal  -     fluticasone (FLONASE) 50 MCG/ACT nasal spray; 1 spray by Each Nostril route daily, Disp-16 g, R-2Normal  2. Acute bacterial sinusitis  -     amoxicillin (AMOXIL) 875 MG tablet; Take 1 tablet by mouth 2 times daily for 7 days, Disp-14 tablet, R-0Normal          No follow-ups on file.      HPI     Sinus Problem  This is a new problem. The current episode started 1 to 4 weeks ago. The problem has been waxing and waning since onset. There has been no fever. He is experiencing no pain. Associated symptoms include congestion, ear pain, headaches, sinus pressure and sneezing. Pertinent negatives include no chills, coughing, diaphoresis, hoarse voice, neck pain, shortness of breath, sore throat or swollen glands. Treatments tried: tylenol cold and flu.       BP Readings from Last 3 Encounters:   04/03/25 118/82   01/29/25 124/74   12/09/24 124/78              Past Medical History:   Diagnosis Date    Allergic rhinitis     Anxiety 10/20/2021    Seems to be getting worse    Chronic back pain     Depression     Not really feeling depressed.    Gastroesophageal reflux disease without esophagitis 10/20/2021    Headache 2/4/2021    Obesity Age 34      Past Surgical History:   Procedure Laterality Date    COLONOSCOPY N/A 04/26/2022    COLONOSCOPY WITH

## 2025-04-03 NOTE — PATIENT INSTRUCTIONS
Instructions for using FLONASE  Gently blow your nose to clear your nostrils.  Place the tip of the nozzle in one nostril and close the other nostril with your finger.  Aim slightly away from the center of your nose, press the white nozzle and sniff the mist in gently. ...  Exhale through your mouth.

## 2025-05-19 DIAGNOSIS — K86.89 PANCREATIC INSUFFICIENCY: ICD-10-CM

## 2025-05-19 NOTE — TELEPHONE ENCOUNTER
Not a medication I can continue to prescribe, has to come from specialist. Did he establish with new GI?

## 2025-05-19 NOTE — TELEPHONE ENCOUNTER
Last visit: 1/29/25  Last Med refill:   Does patient have enough medication for 72 hours: unknown, electronic request    Next Visit Date:  Future Appointments   Date Time Provider Department Center   6/12/2025  4:00 PM Vladimir Dahl MD [facility]       Health Maintenance   Topic Date Due    Hepatitis C screen  Never done    Hepatitis B vaccine (1 of 3 - 19+ 3-dose series) Never done    COVID-19 Vaccine (1 - 2024-25 season) Never done    Flu vaccine (Season Ended) 08/01/2025    Depression Screen  01/28/2026    Lipids  11/27/2029    DTaP/Tdap/Td vaccine (2 - Td or Tdap) 08/25/2031    Hepatitis A vaccine  Aged Out    Hib vaccine  Aged Out    HPV vaccine  Aged Out    Polio vaccine  Aged Out    Meningococcal (ACWY) vaccine  Aged Out    Meningococcal B vaccine  Aged Out    Pneumococcal 0-49 years Vaccine  Aged Out    Varicella vaccine  Discontinued    Diabetes screen  Discontinued    HIV screen  Discontinued       Hemoglobin A1C (%)   Date Value   11/27/2024 5.1   04/01/2022 5.5             ( goal A1C is < 7)   No components found for: \"LABMICR\"  No components found for: \"LDLCHOLESTEROL\", \"LDLCALC\"    (goal LDL is <100)   AST (U/L)   Date Value   11/27/2024 27   11/27/2024 27     ALT (U/L)   Date Value   11/27/2024 42   11/27/2024 41     BUN (mg/dL)   Date Value   11/27/2024 13   11/27/2024 13     BP Readings from Last 3 Encounters:   04/03/25 118/82   01/29/25 124/74   12/09/24 124/78          (goal 120/80)    All Future Testing planned in [facility]  Lab Frequency Next Occurrence   COLONOSCOPY W/ OR W/O BIOPSY Once 11/07/2024               Patient Active Problem List:     Chronic right shoulder pain     Allergic rhinitis     Anxiety     Gastroesophageal reflux disease without esophagitis     Loud snoring     Frequent nocturnal awakening     Dyspepsia     Diarrhea     Gastritis without bleeding     Abdominal pain, left lower quadrant     Migraine with aura and without status migrainosus, not intractable       Please

## 2025-05-21 RX ORDER — PANCRELIPASE 36000; 180000; 114000 [USP'U]/1; [USP'U]/1; [USP'U]/1
1 CAPSULE, DELAYED RELEASE PELLETS ORAL 2 TIMES DAILY
Qty: 60 CAPSULE | Refills: 0 | Status: SHIPPED | OUTPATIENT
Start: 2025-05-21

## 2025-06-05 ENCOUNTER — TELEPHONE (OUTPATIENT)
Dept: GASTROENTEROLOGY | Age: 40
End: 2025-06-05

## 2025-06-20 ENCOUNTER — OFFICE VISIT (OUTPATIENT)
Dept: GASTROENTEROLOGY | Age: 40
End: 2025-06-20
Payer: COMMERCIAL

## 2025-06-20 VITALS
DIASTOLIC BLOOD PRESSURE: 87 MMHG | WEIGHT: 201 LBS | BODY MASS INDEX: 29.67 KG/M2 | SYSTOLIC BLOOD PRESSURE: 124 MMHG | HEART RATE: 53 BPM

## 2025-06-20 DIAGNOSIS — R10.32 ABDOMINAL PAIN, LEFT LOWER QUADRANT: ICD-10-CM

## 2025-06-20 DIAGNOSIS — R19.7 DIARRHEA, UNSPECIFIED TYPE: ICD-10-CM

## 2025-06-20 DIAGNOSIS — K86.89 PANCREATIC INSUFFICIENCY: Primary | ICD-10-CM

## 2025-06-20 DIAGNOSIS — R14.0 BLOATING: ICD-10-CM

## 2025-06-20 PROCEDURE — 99214 OFFICE O/P EST MOD 30 MIN: CPT | Performed by: PHYSICIAN ASSISTANT

## 2025-06-20 NOTE — PROGRESS NOTES
GI CLINIC FOLLOW UP    INTERVAL HISTORY:   No referring provider defined for this encounter.    Chief Complaint   Patient presents with    3 Month Follow-Up     3 Month F/U    Diarrhea     On and Off with Diarrhea       HISTORY OF PRESENT ILLNESS: Mr.Nicholas ERIKA Doyle is a 39 y.o. male , referred for evaluation of GERD, IBS, pancreatic insufficiency.    Here for f/u  Prescribed creon 36,000 BID before meals and simethicone prn   While on Creon, feal elastase measuring >800; fecal fat normal.  Labs stable  Repeat CT abd pelvis ordered 11/2024 has not been completed  Continues to have dyspepsia, nausea, lower abdominal cramping/pain, intermittent constipation and diarrhea, states it is very unpredictable. Pain often resolves with stooling. Stool remains very greasy in consistency. Has not tried adding fiber/imodium. Notes that his symptoms all worsen with cruciferous vegetables. Also notes that they are worse around periods of stress.     Denies fever/chills, change in appetite, unintentional weight loss, dysphagia/odynophagia, n/v, heartburn, black or bloody stools.       Last EGD:   4/26/22-  POSTOPERATIVE DIAGNOSIS:   1) Subtle tertiary contractions of the esophagus in the mid-segment vs concentric rings. Cold biopsy taken of proximal and distal esophagus to evaluate for EoE  2) Mild antritis, no ulcerations, no erosions. Cold biopsy of the stomach taken to evaluate for H. Pylori  3) Normal appearing duodenal mucosa     BX showing eosinophilia up to 13 per high-power field.    Last colonoscopy:   4/26/22 -   POSTPROCEDURE DIAGNOSIS:  FAIR to ADEQUATE PREP   1) No evidence of colitis/ileitis, no ulcerations, no erosions, normal vascular pattern and haustral folds  2) Random left and right colon cold biopsies taken to evaluate for microscopic colitis  3) Mild external hemorrhoids    Labs:  11/27/24 - normal LFTs, mild elevation direct bilirubin 0.3; normal celiac panel, normal TG    Last abd imaging: no

## 2025-08-28 ENCOUNTER — PATIENT MESSAGE (OUTPATIENT)
Dept: GASTROENTEROLOGY | Age: 40
End: 2025-08-28

## 2025-08-28 DIAGNOSIS — K63.8219 SMALL INTESTINAL BACTERIAL OVERGROWTH (SIBO): Primary | ICD-10-CM

## 2025-09-03 ENCOUNTER — TELEPHONE (OUTPATIENT)
Dept: GASTROENTEROLOGY | Age: 40
End: 2025-09-03

## (undated) DEVICE — PERRYSBURG ENDO PACK: Brand: MEDLINE INDUSTRIES, INC.

## (undated) DEVICE — Device: Brand: DEFENDO VALVE AND CONNECTOR KIT

## (undated) DEVICE — 4-PORT MANIFOLD: Brand: NEPTUNE 2

## (undated) DEVICE — FORCEPS BX L240CM JAW DIA2.8MM L CAP W/ NDL MIC MESH TOOTH

## (undated) DEVICE — BITEBLOCK 54FR W/ DENT RIM BLOX